# Patient Record
Sex: FEMALE | Race: WHITE | Employment: FULL TIME | URBAN - METROPOLITAN AREA
[De-identification: names, ages, dates, MRNs, and addresses within clinical notes are randomized per-mention and may not be internally consistent; named-entity substitution may affect disease eponyms.]

---

## 2017-04-28 PROBLEM — D50.9 IRON DEFICIENCY ANEMIA: Status: ACTIVE | Noted: 2017-04-28

## 2017-07-15 ENCOUNTER — HOSPITAL ENCOUNTER (INPATIENT)
Age: 37
LOS: 2 days | Discharge: HOME | End: 2017-07-17
Attending: OBSTETRICS & GYNECOLOGY | Admitting: OBSTETRICS & GYNECOLOGY
Payer: COMMERCIAL

## 2017-07-15 ENCOUNTER — ANESTHESIA EVENT (OUTPATIENT)
Dept: LABOR AND DELIVERY | Age: 37
End: 2017-07-15
Payer: COMMERCIAL

## 2017-07-15 ENCOUNTER — ANESTHESIA (OUTPATIENT)
Dept: LABOR AND DELIVERY | Age: 37
End: 2017-07-15
Payer: COMMERCIAL

## 2017-07-15 PROBLEM — Z37.9 NORMAL LABOR: Status: ACTIVE | Noted: 2017-07-15

## 2017-07-15 LAB
ERYTHROCYTE [DISTWIDTH] IN BLOOD BY AUTOMATED COUNT: 17 % (ref 11.9–14.6)
HCT VFR BLD AUTO: 36.4 % (ref 35.8–46.3)
HGB BLD-MCNC: 12.5 G/DL (ref 11.7–15.4)
MCH RBC QN AUTO: 30.9 PG (ref 26.1–32.9)
MCHC RBC AUTO-ENTMCNC: 34.3 G/DL (ref 31.4–35)
MCV RBC AUTO: 89.9 FL (ref 79.6–97.8)
PLATELET # BLD AUTO: 262 K/UL (ref 150–450)
PMV BLD AUTO: 11 FL (ref 10.8–14.1)
RBC # BLD AUTO: 4.05 M/UL (ref 4.05–5.25)
WBC # BLD AUTO: 12 K/UL (ref 4.3–11.1)

## 2017-07-15 PROCEDURE — 85027 COMPLETE CBC AUTOMATED: CPT | Performed by: OBSTETRICS & GYNECOLOGY

## 2017-07-15 PROCEDURE — 65270000029 HC RM PRIVATE

## 2017-07-15 PROCEDURE — 99283 EMERGENCY DEPT VISIT LOW MDM: CPT

## 2017-07-15 PROCEDURE — 77030014125 HC TY EPDRL BBMI -B: Performed by: ANESTHESIOLOGY

## 2017-07-15 PROCEDURE — 74011250636 HC RX REV CODE- 250/636

## 2017-07-15 PROCEDURE — 74011250637 HC RX REV CODE- 250/637: Performed by: OBSTETRICS & GYNECOLOGY

## 2017-07-15 PROCEDURE — 86900 BLOOD TYPING SEROLOGIC ABO: CPT | Performed by: OBSTETRICS & GYNECOLOGY

## 2017-07-15 PROCEDURE — 74011250637 HC RX REV CODE- 250/637: Performed by: ANESTHESIOLOGY

## 2017-07-15 PROCEDURE — 77030003665 HC NDL SPN BBMI -A: Performed by: ANESTHESIOLOGY

## 2017-07-15 PROCEDURE — 74011000250 HC RX REV CODE- 250

## 2017-07-15 PROCEDURE — 4A1HXCZ MONITORING OF PRODUCTS OF CONCEPTION, CARDIAC RATE, EXTERNAL APPROACH: ICD-10-PCS | Performed by: OBSTETRICS & GYNECOLOGY

## 2017-07-15 RX ORDER — DEXTROSE, SODIUM CHLORIDE, SODIUM LACTATE, POTASSIUM CHLORIDE, AND CALCIUM CHLORIDE 5; .6; .31; .03; .02 G/100ML; G/100ML; G/100ML; G/100ML; G/100ML
125 INJECTION, SOLUTION INTRAVENOUS CONTINUOUS
Status: DISCONTINUED | OUTPATIENT
Start: 2017-07-15 | End: 2017-07-16 | Stop reason: HOSPADM

## 2017-07-15 RX ORDER — MINERAL OIL
120 OIL (ML) ORAL
Status: COMPLETED | OUTPATIENT
Start: 2017-07-15 | End: 2017-07-15

## 2017-07-15 RX ORDER — SODIUM CHLORIDE 0.9 % (FLUSH) 0.9 %
5-10 SYRINGE (ML) INJECTION AS NEEDED
Status: DISCONTINUED | OUTPATIENT
Start: 2017-07-15 | End: 2017-07-16 | Stop reason: HOSPADM

## 2017-07-15 RX ORDER — ROPIVACAINE HYDROCHLORIDE 2 MG/ML
INJECTION, SOLUTION EPIDURAL; INFILTRATION; PERINEURAL
Status: DISCONTINUED | OUTPATIENT
Start: 2017-07-15 | End: 2017-07-16 | Stop reason: HOSPADM

## 2017-07-15 RX ORDER — ROPIVACAINE HYDROCHLORIDE 2 MG/ML
INJECTION, SOLUTION EPIDURAL; INFILTRATION; PERINEURAL AS NEEDED
Status: DISCONTINUED | OUTPATIENT
Start: 2017-07-15 | End: 2017-07-16 | Stop reason: HOSPADM

## 2017-07-15 RX ORDER — OXYTOCIN/RINGER'S LACTATE 15/250 ML
250 PLASTIC BAG, INJECTION (ML) INTRAVENOUS ONCE
Status: COMPLETED | OUTPATIENT
Start: 2017-07-15 | End: 2017-07-16

## 2017-07-15 RX ORDER — LIDOCAINE HYDROCHLORIDE AND EPINEPHRINE 15; 5 MG/ML; UG/ML
INJECTION, SOLUTION EPIDURAL AS NEEDED
Status: DISCONTINUED | OUTPATIENT
Start: 2017-07-15 | End: 2017-07-16 | Stop reason: HOSPADM

## 2017-07-15 RX ORDER — BUTORPHANOL TARTRATE 1 MG/ML
1 INJECTION INTRAMUSCULAR; INTRAVENOUS
Status: DISCONTINUED | OUTPATIENT
Start: 2017-07-15 | End: 2017-07-16 | Stop reason: HOSPADM

## 2017-07-15 RX ORDER — LIDOCAINE HYDROCHLORIDE 20 MG/ML
JELLY TOPICAL
Status: DISCONTINUED | OUTPATIENT
Start: 2017-07-15 | End: 2017-07-16 | Stop reason: HOSPADM

## 2017-07-15 RX ORDER — FAMOTIDINE 20 MG/1
20 TABLET, FILM COATED ORAL ONCE
Status: COMPLETED | OUTPATIENT
Start: 2017-07-15 | End: 2017-07-15

## 2017-07-15 RX ORDER — SODIUM CHLORIDE 0.9 % (FLUSH) 0.9 %
5-10 SYRINGE (ML) INJECTION EVERY 8 HOURS
Status: DISCONTINUED | OUTPATIENT
Start: 2017-07-15 | End: 2017-07-16 | Stop reason: HOSPADM

## 2017-07-15 RX ORDER — LIDOCAINE HYDROCHLORIDE 10 MG/ML
1 INJECTION INFILTRATION; PERINEURAL
Status: COMPLETED | OUTPATIENT
Start: 2017-07-15 | End: 2017-07-16

## 2017-07-15 RX ADMIN — LIDOCAINE HYDROCHLORIDE AND EPINEPHRINE 5 ML: 15; 5 INJECTION, SOLUTION EPIDURAL at 20:03

## 2017-07-15 RX ADMIN — ROPIVACAINE HYDROCHLORIDE 8 ML/HR: 2 INJECTION, SOLUTION EPIDURAL; INFILTRATION; PERINEURAL at 20:06

## 2017-07-15 RX ADMIN — FAMOTIDINE 20 MG: 20 TABLET ORAL at 19:48

## 2017-07-15 RX ADMIN — MINERAL OIL 120 ML: 471.95 OIL ORAL at 23:55

## 2017-07-15 RX ADMIN — ROPIVACAINE HYDROCHLORIDE 5 ML: 2 INJECTION, SOLUTION EPIDURAL; INFILTRATION; PERINEURAL at 20:05

## 2017-07-15 NOTE — IP AVS SNAPSHOT
Current Discharge Medication List  
  
START taking these medications Dose & Instructions Dispensing Information Comments Morning Noon Evening Bedtime HYDROcodone-acetaminophen 7.5-325 mg per tablet Commonly known as:  Jimena Ivan Your last dose was: Your next dose is:    
   
   
 Dose:  1 Tab Take 1 Tab by mouth every six (6) hours as needed. Max Daily Amount: 4 Tabs. Quantity:  20 Tab Refills:  0  
     
   
   
   
  
 ibuprofen 800 mg tablet Commonly known as:  MOTRIN Your last dose was: Your next dose is:    
   
   
 Dose:  800 mg Take 1 Tab by mouth every six (6) hours as needed. Quantity:  90 Tab Refills:  0 CONTINUE these medications which have NOT CHANGED Dose & Instructions Dispensing Information Comments Morning Noon Evening Bedtime  
 doxylamine-pyridoxine 10-10 mg Tbec Commonly known as:  Luberta Lion Your last dose was: Your next dose is:    
   
   
 Dose:  2 Each Take 2 Each by mouth nightly as needed. 2 po qhs  
 Quantity:  60 Tab Refills:  11 Iron, Cbn & Gluc-FA-B12-C-DSS 90-1-12-50 mg-mg-mcg-mg Tab Commonly known as:  FERRALET 90 DUAL-IRON DELIVERY Your last dose was: Your next dose is:    
   
   
 Dose:  1 Tab Take 1 Tab by mouth daily. Quantity:  90 Tab Refills:  2 OTHER Your last dose was: Your next dose is:    
   
   
  Refills:  0 Where to Get Your Medications These medications were sent to [address] [address] Hours:  24-hours Phone:  [phone]  
  ibuprofen 800 mg tablet Information on where to get these meds will be given to you by the nurse or doctor. ! Ask your nurse or doctor about these medications HYDROcodone-acetaminophen 7.5-325 mg per tablet

## 2017-07-15 NOTE — H&P
History & Physical    Name: Danny Gonzales MRN: [id number]  SSN: [id number]    YOB: 1980  Age: 29 y.o. Sex: female        Subjective:     Estimated Date of Delivery: 17  OB History    Para Term  AB Living   1 0 0      SAB TAB Ectopic Molar Multiple Live Births              # Outcome Date GA Lbr Avni/2nd Weight Sex Delivery Anes PTL Lv   1 Current                   Ms. Naila Romo is seen with pregnancy at 39w4d for active labor. Began having stronger contractions earlier today. SROM just prior to arrival. Prenatal course was normal.  the patients states that the baby moves as usual   Please see prenatal records for details. OB History    Para Term  AB Living   1 0 0      SAB TAB Ectopic Molar Multiple Live Births              # Outcome Date GA Lbr Avni/2nd Weight Sex Delivery Anes PTL Lv   1 Current                   Past Medical History:   Diagnosis Date    Iron deficiency anemia 2017     Past Surgical History:   Procedure Laterality Date    HX WISDOM TEETH EXTRACTION       Social History     Occupational History    Not on file. Social History Main Topics    Smoking status: Never Smoker    Smokeless tobacco: Never Used    Alcohol use No      Comment: social/none now    Drug use: No    Sexual activity: Yes     Partners: Male     Birth control/ protection: None     Family History   Problem Relation Age of Onset    Ovarian Cancer Mother 43    Cancer Mother      uterine    Colon Cancer Neg Hx     Breast Cancer Neg Hx        Allergies   Allergen Reactions    Augmentin [Amoxicillin-Pot Clavulanate] Hives     Prior to Admission medications    Medication Sig Start Date End Date Taking? Authorizing Provider   Iron, Cbn & Gluc-FA-B12-C-DSS (FERRALET 90 DUAL-IRON DELIVERY) 90-1-12-50 mg-mg-mcg-mg tab Take 1 Tab by mouth daily.  5/15/17   Felicity Mckinney MD   OTHER     Historical Provider   doxylamine-pyridoxine (DICLEGIS) 10-10 mg TbEC Take 2 Each by mouth nightly as needed. 2 po qhs 16   Heath Crow MD        Review of Systems:  Constitutional:No headache, fever  Cardiac:   No chest pain      Resp: No cough or shortness of breath     GI:   No nausea/vomiting, diarrhea, abdominal pain    :   No dysuria  Neuro:     No vision changes, headache      Objective:     Vitals: There were no vitals filed for this visit. Physical Exam:  Patient without distress. Heart: Regular rate and rhythm  Lung: clear to auscultation throughout lung fields, no wheezes, no rales, no rhonchi and normal respiratory effort  Back: costovertebral angle tenderness absent  Abdomen: soft, nontender, without guarding, without rebound  Fundus: soft and non tender  Cervical Exam: 5 cm dilated    100% effaced    0 station    Presenting Part: cephalic  Cervical Position: mid position  Lower Extremities:  - Edema 1+  Membranes:  Spontaneous Rupture of Membranes; Amniotic Fluid: clear fluid  Fetal Heart Rate tracing: Category 1  Uterine contractions: regular, every 3 minutes    Prenatal Labs:   Lab Results   Component Value Date/Time    Rubella, External immune 2016    GrBStrep, External negative 2017    HBsAg, External negative 2016    HIV, External negative 2016    RPR, External non reactive 2016    Gonorrhea, External Negative 2016    Chlamydia, External Negative 2016         Assessment/Plan:     Ms. Doroteo Alonso is a  seen with pregnancy at 39w4d for srom. Plan:     Admit for labor management    Patient discussed with Dr. Nadia Perez MD

## 2017-07-15 NOTE — IP AVS SNAPSHOT
[address] 
[phone] Patient: Fatimah Jennings MRN: [id number] You are allergic to the following Allergen Reactions Augmentin (Amoxicillin-Pot Clavulanate) Hives Recent Documentation Height Breastfeeding? OB Status Smoking Status 1.626 m Unknown Recent pregnancy Never Smoker Emergency Contacts Name Discharge Info Relation Home Work Mobile Juice Samayoa  Spouse [3] [phone] About your hospitalization You were admitted on:  July 15, 2017 You last received care in the:  [address] [facility] You were discharged on:  July 17, 2017 Unit phone number:  [phone] Why you were hospitalized Your primary diagnosis was:  Normal Labor Your diagnoses also included:  Normal Pregnancy Providers Seen During Your Hospitalizations Provider Role Specialty Primary office phone Bette Tracy MD Attending Provider Obstetrics & Gynecology [phone] Your Primary Care Physician (PCP) Primary Care Physician Office Phone Office Fax NONE ** None ** ** None ** Follow-up Information Follow up With Details Comments Contact Info None   None ([phone]) Patient stated that they have no PCP Joaquin Pickering MD In 6 weeks  [address] [address] 
[phone] Your Appointments Monday July 17, 2017  4:10 PM EDT Return OB with Charlene Gomez MD  
[facility] [address] [place] [id number] [phone] Current Discharge Medication List  
  
START taking these medications Dose & Instructions Dispensing Information Comments Morning Noon Evening Bedtime HYDROcodone-acetaminophen 7.5-325 mg per tablet Commonly known as:  Zaira Iron Your last dose was: Your next dose is: Dose:  1 Tab Take 1 Tab by mouth every six (6) hours as needed. Max Daily Amount: 4 Tabs. Quantity:  20 Tab Refills:  0  
     
   
   
   
  
 ibuprofen 800 mg tablet Commonly known as:  MOTRIN Your last dose was: Your next dose is:    
   
   
 Dose:  800 mg Take 1 Tab by mouth every six (6) hours as needed. Quantity:  90 Tab Refills:  0 CONTINUE these medications which have NOT CHANGED Dose & Instructions Dispensing Information Comments Morning Noon Evening Bedtime  
 doxylamine-pyridoxine 10-10 mg Tbec Commonly known as:  Senait Bi Your last dose was: Your next dose is:    
   
   
 Dose:  2 Each Take 2 Each by mouth nightly as needed. 2 po qhs  
 Quantity:  60 Tab Refills:  11 Iron, Cbn & Gluc-FA-B12-C-DSS 90-1-12-50 mg-mg-mcg-mg Tab Commonly known as:  FERRALET 90 DUAL-IRON DELIVERY Your last dose was: Your next dose is:    
   
   
 Dose:  1 Tab Take 1 Tab by mouth daily. Quantity:  90 Tab Refills:  2 OTHER Your last dose was: Your next dose is:    
   
   
  Refills:  0 Where to Get Your Medications These medications were sent to [address] [address] Hours:  24-hours Phone:  [phone]  
  ibuprofen 800 mg tablet Information on where to get these meds will be given to you by the nurse or doctor. ! Ask your nurse or doctor about these medications HYDROcodone-acetaminophen 7.5-325 mg per tablet Discharge Instructions Discharge instruction to follow: Activity: Pelvis rest for 6 weeks No heavy lifting over 15 lbs for 2 weeks No driving for 2 weeks No push/pull motion such as sweeping or vacuuming for 2 weeks No tub baths for 6 weeks If using sitz bath continue until comfortable stopping. If using jerzy-bottle continue to use until comfortable stopping. Change sanitary pad after each urination or bowel movement. Call MD for the following: 
    Fever over 101 F; pain not relieved by medication; foul smelling vaginal discharge or an increase in vaginal bleeding. Take medication as prescribed. Follow up with MD as order. After Your Delivery (the Postpartum Period): Care Instructions Your Care Instructions Congratulations on the birth of your baby. Like pregnancy, the  period can be a time of excitement, j luis, and exhaustion. You may look at your wondrous little baby and feel happy. You may also be overwhelmed by your new sleep hours and new responsibilities. At first, babies often sleep during the days and are awake at night. They do not have a pattern or routine. They may make sudden gasps, jerk themselves awake, or look like they have crossed eyes. These are all normal, and they may even make you smile. In these first weeks after delivery, try to take good care of yourself. It may take 4 to 6 weeks to feel like yourself again, and possibly longer if you had a  birth. You will likely feel very tired for several weeks. Your days will be full of ups and downs, but lots of j luis as well. Follow-up care is a key part of your treatment and safety. Be sure to make and go to all appointments, and call your doctor if you are having problems. It's also a good idea to know your test results and keep a list of the medicines you take. How can you care for yourself at home? Take care of your body after delivery · Use pads instead of tampons for the bloody flow that may last as long as 2 weeks. · Ease cramps with ibuprofen (Advil, Motrin). · Ease soreness of hemorrhoids and the area between your vagina and rectum with ice compresses or witch hazel pads. · Ease constipation by drinking lots of fluid and eating high-fiber foods. Ask your doctor about over-the-counter stool softeners. · Cleanse yourself with a gentle squeeze of warm water from a bottle instead of wiping with toilet paper. · Take a sitz bath in warm water several times a day. · Wear a good nursing bra. Ease sore and swollen breasts with warm, wet washcloths. · If you are not breastfeeding, use ice rather than heat for breast soreness. · Your period may not start for several months if you are breastfeeding. You may bleed more, and longer at first, than you did before you got pregnant. · Wait until you are healed (about 4 to 6 weeks) before you have sexual intercourse. Your doctor will tell you when it is okay to have sex. · Try not to travel with your baby for 5 or 6 weeks. If you take a long car trip, make frequent stops to walk around and stretch. Avoid exhaustion · Rest every day. Try to nap when your baby naps. · Ask another adult to be with you for a few days after delivery. · Plan for  if you have other children. · Stay flexible so you can eat at odd hours and sleep when you need to. Both you and your baby are making new schedules. · Plan small trips to get out of the house. Change can make you feel less tired. · Ask for help with housework, cooking, and shopping. Remind yourself that your job is to care for your baby. Know about help for postpartum depression · \"Baby blues\" are common for the first 1 to 2 weeks after birth. You may cry or feel sad or irritable for no reason. · Rest whenever you can. Being tired makes it harder to handle your emotions. · Go for walks with your baby. · Talk to your partner, friends, and family about your feelings. · If your symptoms last for more than a few weeks, or if you feel very depressed, ask your doctor for help. · Postpartum depression can be treated. Support groups and counseling can help. Sometimes medicine can also help. Stay healthy · Eat healthy foods so you have more energy, make good breast milk, and lose extra baby pounds. · If you breastfeed, avoid alcohol and drugs. Stay smoke-free. If you quit during pregnancy, congratulations. · Start daily exercise after 4 to 6 weeks, but rest when you feel tired. · Learn exercises to tone your belly. Do Kegel exercises to regain strength in your pelvic muscles. You can do these exercises while you stand or sit. ¨ Squeeze the same muscles you would use to stop your urine. Your belly and thighs should not move. ¨ Hold the squeeze for 3 seconds, and then relax for 3 seconds. ¨ Start with 3 seconds. Then add 1 second each week until you are able to squeeze for 10 seconds. ¨ Repeat the exercise 10 to 15 times for each session. Do three or more sessions each day. · Find a class for new mothers and new babies that has an exercise time. · If you had a  birth, give yourself a bit more time before you exercise, and be careful. When should you call for help? Call 911 anytime you think you may need emergency care. For example, call if: 
· You passed out (lost consciousness). Call your doctor now or seek immediate medical care if: 
· You have severe vaginal bleeding. This means you are passing blood clots and soaking through a pad each hour for 2 or more hours. · You are dizzy or lightheaded, or you feel like you may faint. · You have a fever. · You have new belly pain, or your pain gets worse. Watch closely for changes in your health, and be sure to contact your doctor if: 
· Your vaginal bleeding seems to be getting heavier. · You have new or worse vaginal discharge. · You feel sad, anxious, or hopeless for more than a few days. · You do not get better as expected. Where can you learn more? Go to http://lexa-kamari.info/. Enter A461 in the search box to learn more about \"After Your Delivery (the Postpartum Period): Care Instructions. \" 
 Current as of: March 16, 2017 Content Version: 11.3 © 9674-1939 [employer]. Care instructions adapted under license by [employer] (which disclaims liability or warranty for this information). If you have questions about a medical condition or this instruction, always ask your healthcare professional. [address] any warranty or liability for your use of this information. DISCHARGE SUMMARY from Nurse The following personal items are in your possession at time of discharge: 
 
Dental Appliances: None Visual Aid: Contacts Home Medications: None Jewelry: Patience Davis Clothing: Pants, Footwear, Shirt Other Valuables: Erika Johnson PATIENT INSTRUCTIONS: 
 
After general anesthesia or intravenous sedation, for 24 hours or while taking prescription Narcotics: · Limit your activities · Do not drive and operate hazardous machinery · Do not make important personal or business decisions · Do  not drink alcoholic beverages · If you have not urinated within 8 hours after discharge, please contact your surgeon on call. Report the following to your surgeon: 
· Excessive pain, swelling, redness or odor of or around the surgical area · Temperature over 100.5 · Nausea and vomiting lasting longer than 4 hours or if unable to take medications · Any signs of decreased circulation or nerve impairment to extremity: change in color, persistent  numbness, tingling, coldness or increase pain · Any questions What to do at Home: *  Please give a list of your current medications to your Primary Care Provider. *  Please update this list whenever your medications are discontinued, doses are 
    changed, or new medications (including over-the-counter products) are added. *  Please carry medication information at all times in case of emergency situations. These are general instructions for a healthy lifestyle: No smoking/ No tobacco products/ Avoid exposure to second hand smoke Surgeon General's Warning:  Quitting smoking now greatly reduces serious risk to your health. Obesity, smoking, and sedentary lifestyle greatly increases your risk for illness A healthy diet, regular physical exercise & weight monitoring are important for maintaining a healthy lifestyle You may be retaining fluid if you have a history of heart failure or if you experience any of the following symptoms:  Weight gain of 3 pounds or more overnight or 5 pounds in a week, increased swelling in our hands or feet or shortness of breath while lying flat in bed. Please call your doctor as soon as you notice any of these symptoms; do not wait until your next office visit. Recognize signs and symptoms of STROKE: 
 
F-face looks uneven A-arms unable to move or move unevenly S-speech slurred or non-existent T-time-call 911 as soon as signs and symptoms begin-DO NOT go Back to bed or wait to see if you get better-TIME IS BRAIN. Warning Signs of HEART ATTACK Call 911 if you have these symptoms: 
? Chest discomfort. Most heart attacks involve discomfort in the center of the chest that lasts more than a few minutes, or that goes away and comes back. It can feel like uncomfortable pressure, squeezing, fullness, or pain. ? Discomfort in other areas of the upper body. Symptoms can include pain or discomfort in one or both arms, the back, neck, jaw, or stomach. ? Shortness of breath with or without chest discomfort. ? Other signs may include breaking out in a cold sweat, nausea, or lightheadedness. Don't wait more than five minutes to call [address]! Fast action can save your life. Calling 911 is almost always the fastest way to get lifesaving treatment. Emergency Medical Services staff can begin treatment when they arrive  up to an hour sooner than if someone gets to the hospital by car. The discharge information has been reviewed with the patient. The patient verbalized understanding. Discharge medications reviewed with the patient and appropriate educational materials and side effects teaching were provided. Discharge Orders None [facility]! Dear Jennifer Hines: Thank you for requesting a [employer] account. Our records indicate that you already have an active [employer] account. You can access your account anytime at [email or web address] Did you know that you can access your hospital and ER discharge instructions at any time in [employer]? You can also review all of your test results from your hospital stay or ER visit. Additional Information If you have questions, please visit the Frequently Asked Questions section of the [employer] website at [email or web address] Remember, [employer] is NOT to be used for urgent needs. For medical emergencies, dial 911. Now available from your iPhone and Android! General Information Please provide this summary of care documentation to your next provider. Patient Signature:  ____________________________________________________________ Date:  ____________________________________________________________  
  
Maureen Molina Provider Signature:  ____________________________________________________________ Date:  ____________________________________________________________

## 2017-07-16 LAB
ABO + RH BLD: NORMAL
BLOOD GROUP ANTIBODIES SERPL: NORMAL
SPECIMEN EXP DATE BLD: NORMAL

## 2017-07-16 PROCEDURE — 75410000002 HC LABOR FEE PER 1 HR: Performed by: OBSTETRICS & GYNECOLOGY

## 2017-07-16 PROCEDURE — 65270000029 HC RM PRIVATE

## 2017-07-16 PROCEDURE — 74011250636 HC RX REV CODE- 250/636: Performed by: OBSTETRICS & GYNECOLOGY

## 2017-07-16 PROCEDURE — 75410000000 HC DELIVERY VAGINAL/SINGLE: Performed by: OBSTETRICS & GYNECOLOGY

## 2017-07-16 PROCEDURE — 76060000078 HC EPIDURAL ANESTHESIA

## 2017-07-16 PROCEDURE — 77030003028 HC SUT VCRL J&J -A

## 2017-07-16 PROCEDURE — 77030002888 HC SUT CHRMC J&J -A

## 2017-07-16 PROCEDURE — 75410000003 HC RECOV DEL/VAG/CSECN EA 0.5 HR: Performed by: OBSTETRICS & GYNECOLOGY

## 2017-07-16 PROCEDURE — 74011250637 HC RX REV CODE- 250/637: Performed by: OBSTETRICS & GYNECOLOGY

## 2017-07-16 PROCEDURE — 74011000250 HC RX REV CODE- 250: Performed by: OBSTETRICS & GYNECOLOGY

## 2017-07-16 RX ORDER — OXYTOCIN/RINGER'S LACTATE 15/250 ML
250 PLASTIC BAG, INJECTION (ML) INTRAVENOUS ONCE
Status: ACTIVE | OUTPATIENT
Start: 2017-07-16 | End: 2017-07-16

## 2017-07-16 RX ORDER — DOCUSATE SODIUM 100 MG/1
100 CAPSULE, LIQUID FILLED ORAL 2 TIMES DAILY
Status: DISCONTINUED | OUTPATIENT
Start: 2017-07-16 | End: 2017-07-17 | Stop reason: HOSPADM

## 2017-07-16 RX ORDER — IBUPROFEN 800 MG/1
800 TABLET ORAL
Status: DISCONTINUED | OUTPATIENT
Start: 2017-07-16 | End: 2017-07-17 | Stop reason: HOSPADM

## 2017-07-16 RX ORDER — LIDOCAINE HYDROCHLORIDE 20 MG/ML
INJECTION, SOLUTION EPIDURAL; INFILTRATION; INTRACAUDAL; PERINEURAL AS NEEDED
Status: DISCONTINUED | OUTPATIENT
Start: 2017-07-16 | End: 2017-07-16 | Stop reason: HOSPADM

## 2017-07-16 RX ORDER — ZOLPIDEM TARTRATE 5 MG/1
5 TABLET ORAL
Status: DISCONTINUED | OUTPATIENT
Start: 2017-07-16 | End: 2017-07-17 | Stop reason: HOSPADM

## 2017-07-16 RX ORDER — HYDROCODONE BITARTRATE AND ACETAMINOPHEN 7.5; 325 MG/1; MG/1
1 TABLET ORAL
Status: DISCONTINUED | OUTPATIENT
Start: 2017-07-16 | End: 2017-07-17 | Stop reason: HOSPADM

## 2017-07-16 RX ORDER — IBUPROFEN 800 MG/1
800 TABLET ORAL
Status: COMPLETED | OUTPATIENT
Start: 2017-07-16 | End: 2017-07-16

## 2017-07-16 RX ORDER — SIMETHICONE 80 MG
80 TABLET,CHEWABLE ORAL
Status: DISCONTINUED | OUTPATIENT
Start: 2017-07-16 | End: 2017-07-17 | Stop reason: HOSPADM

## 2017-07-16 RX ORDER — LOPERAMIDE HYDROCHLORIDE 2 MG/1
2 CAPSULE ORAL AS NEEDED
Status: DISCONTINUED | OUTPATIENT
Start: 2017-07-16 | End: 2017-07-17 | Stop reason: HOSPADM

## 2017-07-16 RX ORDER — ONDANSETRON 4 MG/1
4 TABLET, ORALLY DISINTEGRATING ORAL
Status: DISCONTINUED | OUTPATIENT
Start: 2017-07-16 | End: 2017-07-17 | Stop reason: HOSPADM

## 2017-07-16 RX ORDER — DIPHENHYDRAMINE HCL 25 MG
25 CAPSULE ORAL
Status: DISCONTINUED | OUTPATIENT
Start: 2017-07-16 | End: 2017-07-17 | Stop reason: HOSPADM

## 2017-07-16 RX ADMIN — IBUPROFEN 800 MG: 800 TABLET, FILM COATED ORAL at 01:47

## 2017-07-16 RX ADMIN — IBUPROFEN 800 MG: 800 TABLET ORAL at 20:42

## 2017-07-16 RX ADMIN — IBUPROFEN 800 MG: 800 TABLET ORAL at 08:09

## 2017-07-16 RX ADMIN — IBUPROFEN 800 MG: 800 TABLET ORAL at 14:06

## 2017-07-16 RX ADMIN — LIDOCAINE HYDROCHLORIDE 10 ML: 20 INJECTION, SOLUTION EPIDURAL; INFILTRATION; INTRACAUDAL; PERINEURAL at 00:12

## 2017-07-16 RX ADMIN — LIDOCAINE HYDROCHLORIDE 0.1 ML: 10 INJECTION, SOLUTION INFILTRATION; PERINEURAL at 00:40

## 2017-07-16 RX ADMIN — DOCUSATE SODIUM 100 MG: 100 CAPSULE, LIQUID FILLED ORAL at 20:42

## 2017-07-16 RX ADMIN — Medication 15000 MILLI-UNITS/HR: at 00:43

## 2017-07-16 RX ADMIN — DOCUSATE SODIUM 100 MG: 100 CAPSULE, LIQUID FILLED ORAL at 08:09

## 2017-07-16 NOTE — LACTATION NOTE
This note was copied from a baby's chart. In to check on feedings. Baby still early in first 24 hours. Has been latching well per mom. In to assist with feeding. Reviewed waking measures and suck practice. Baby had small emesis prior to suck assessment. Showed mom how to burp infant and keep upright. Baby has good suck on assessment. Assisted on right breast in cross cradle hold. Reviewed how to support infant and breast to aide in deep latch. Mom has good technique. Baby latched but took a few minutes for baby to begin to actively suckle. Had to relatch 2 times but then baby latched deeply and continued to actively feed. Baby observed x 20 minutes on right breast. Doing well. Reviewed feeding expectations. Feed on demand. Discussed cluster feeding normalcy. Watch output. Has had void, and stool.

## 2017-07-16 NOTE — ROUTINE PROCESS
SBAR IN Report: Mother    Verbal report received from Brooklyn Guy RN on this patient, who is now being transferred from [facility] (unit) for routine progression of care. The patient is not wearing a green \"Anesthesia-Duramorph\" band. Report consisted of patient's Situation, Background, Assessment and Recommendations (SBAR).  ID bands were compared with the identification form, and verified with the patient and transferring nurse. Information from the Procedure Summary and the [place] Report was reviewed with the transferring nurse; opportunity for questions and clarification provided.

## 2017-07-16 NOTE — PROGRESS NOTES
2347- COMPLETE, MD AWARE, Orders to start pushing. 2350-pushing started    0020-pt set and prepped for delivery.     1- , viable female infant, apgars 9/9, wt 7.14oz, Dr Rosie Hernández

## 2017-07-16 NOTE — L&D DELIVERY NOTE
Delivery Summary    Patient: Josh Sicard MRN: [id number]  SSN: [id number]    YOB: 1980  Age: 29 y.o. Sex: female       Information for the patient's :  Chyna Sivla [[id number]]       Labor Events:    Labor: No   Rupture Date: 7/15/2017   Rupture Time: 5:30 PM   Rupture Type SROM   Amniotic Fluid Volume:      Amniotic Fluid Description: Clear None   Induction: None       Augmentation: None   Labor Events: None     Cervical Ripening:     None     Delivery Events:  Episiotomy: None;Midline   Laceration(s): None; Other (comment)     Repaired: Yes    Number of Repair Packets: 2   Suture Type and Size: Chromic 3-0  Vicryl 2-0     Estimated Blood Loss (ml): 200ml       Delivery Date: 2017    Delivery Time: 12:35 AM  Delivery Type: Vaginal, Spontaneous Delivery  Sex:  Female     Gestational Age: 38w11d   Delivery Clinician:  Jose Bethea  Living Status: Living   Delivery Location: [address]D [address]          APGARS  One minute Five minutes Ten minutes   Skin color: 1   1        Heart rate: 2   2        Grimace: 2   2        Muscle tone: 2   2        Breathin   2        Totals: 9   9            Presentation: Vertex    Position: Right Occiput Anterior  Resuscitation Method:  Suctioning-bulb; Tactile Stimulation     Meconium Stained: None      Cord Vessels: 3 Vessels      Cord Events:    Cord Blood Sent?:  Yes    Blood Gases Sent?:  No    Placenta:  Date/Time:  12:41 AM  Removal: Spontaneous      Appearance: Normal;Intact      Measurements:  Birth Weight: 7 lb 14 oz (3.572 kg)      Birth Length: 52 cm      Head Circumference: 33 cm      Chest Circumference: 33 cm     Abdominal Girth:       Other Providers:   Seth HERNANDEZ;GABRIELLE AMAYA;DAILY FINLEY;NELLY MAY, Obstetrician;Primary Nurse;Charge Nurse;Scrub Tech           Group B Strep:   Lab Results   Component Value Date/Time    Hayley, External negative 2017     Information for the patient's :  Rea Vaughn [[id number]]   No results found for: ABORH, PCTABR, PCTDIG, BILI, ABORHEXT, ABORH    No results found for: APH, APCO2, APO2, AHCO3, ABEC, ABDC, O2ST, EPHV, PCO2V, PO2V, HCO3V, EBEV, EBDV, SITE, RSCOM

## 2017-07-16 NOTE — PROGRESS NOTES
SBAR OUT Report: Mother    Verbal report given to SWEETIE Santizo RN on this patient, who is now being transferred to [facility] (unit) for routine progression of care. The patient is not wearing a green \"Anesthesia-Duramorph\" band. Report consisted of patient's Situation, Background, Assessment and Recommendations (SBAR).  ID bands were compared with the identification form, and verified with the patient and receiving nurse. Information from the SBAR, Procedure Summary, Intake/Output and MAR and the [place] Report was reviewed with the receiving nurse; opportunity for questions and clarification provided.

## 2017-07-16 NOTE — ANESTHESIA PROCEDURE NOTES
Epidural Block    Performed by: Pablito Blackwell  Authorized by: Pablito Blackwell     Pre-Procedure  Indication: labor epidural    Preanesthetic Checklist: patient identified, risks and benefits discussed, anesthesia consent, patient being monitored, timeout performed and anesthesia consent    Timeout Time: 19:55        Epidural:   Patient position:  Seated  Prep region:  Lumbar  Prep: Patient draped and Chlorhexidine    Location:  L3-4    Needle and Epidural Catheter:   Needle Type:  Tuohy  Needle Gauge:  17 G  Injection Technique:  Loss of resistance using air  Attempts:  1  Catheter Size:  19 G  Events: no blood with aspiration, no cerebrospinal fluid with aspiration, no paresthesia and negative aspiration test    Test Dose:  Lidocaine 1.5% w/ epi and negative    Assessment:   Catheter Secured:  Tegaderm and tape  Insertion:  Uncomplicated  Patient tolerance:  Patient tolerated the procedure well with no immediate complications  All needles out intact, procedure tolerated well without problems

## 2017-07-16 NOTE — PROGRESS NOTES
2242 Dr. Feliciano Grant at bedside. Reviewing FHR tracing. SVE 9/100/+1. Fetal position transverse. No new orders received.

## 2017-07-16 NOTE — H&P
History & Physical    Name: Duncan Shelby MRN: [id number]  SSN: [id number]    YOB: 1980  Age: 29 y.o. Sex: female        Subjective:     Estimated Date of Delivery: 17  OB History    Para Term  AB Living   1 0 0      SAB TAB Ectopic Molar Multiple Live Births              # Outcome Date GA Lbr Avni/2nd Weight Sex Delivery Anes PTL Lv   1 Current                   Ms. James Germain is admitted with pregnancy at 39w4d for active labor. Prenatal course was normal. Please see prenatal records for details. Past Medical History:   Diagnosis Date    Iron deficiency anemia 2017     Past Surgical History:   Procedure Laterality Date    HX WISDOM TEETH EXTRACTION       Social History     Occupational History    Not on file. Social History Main Topics    Smoking status: Never Smoker    Smokeless tobacco: Never Used    Alcohol use No      Comment: social/none now    Drug use: No    Sexual activity: Yes     Partners: Male     Birth control/ protection: None     Family History   Problem Relation Age of Onset    Ovarian Cancer Mother 43    Cancer Mother      uterine    Colon Cancer Neg Hx     Breast Cancer Neg Hx        Allergies   Allergen Reactions    Augmentin [Amoxicillin-Pot Clavulanate] Hives     Prior to Admission medications    Medication Sig Start Date End Date Taking? Authorizing Provider   Iron, Cbn & Gluc-FA-B12-C-DSS (FERRALET 90 DUAL-IRON DELIVERY) 90-1-12-50 mg-mg-mcg-mg tab Take 1 Tab by mouth daily. 5/15/17  Yes Manohar Maya MD   doxylamine-pyridoxine (DICLEGIS) 10-10 mg TbEC Take 2 Each by mouth nightly as needed. 2 po qhs 16  Yes Manohar Maya MD   OTHER     Historical Provider        Review of Systems: A comprehensive review of systems was negative except for that written in the HPI.     Objective:     Vitals:  Vitals:    07/15/17 2111 07/15/17 2115 07/15/17 2131 07/15/17 2144   BP: 125/84 115/78 114/81 118/86   Pulse: 100 83 (!) 107 89   Resp: Temp:       Height:            Physical Exam:  Patient without distress. Heart: Regular rate and rhythm  Lung: clear to auscultation throughout lung fields, no wheezes, no rales, no rhonchi and normal respiratory effort  Abdomen: soft, nontender  Fundus: soft and non tender  Cervical Exam: 9/100 %/+1/   Membranes:  Spontaneous Rupture of Membranes; Amniotic Fluid: clear fluid  Fetal Heart Rate: Reactive    Prenatal Labs:   Lab Results   Component Value Date/Time    ABO/Rh(D) O POSITIVE 07/15/2017 07:25 PM    Rubella, External immune 12/06/2016    GrBStrep, External negative 06/22/2017    HBsAg, External negative 12/06/2016    HIV, External negative 12/06/2016    RPR, External non reactive 12/06/2016    Gonorrhea, External Negative 11/29/2016    Chlamydia, External Negative 11/29/2016    ABO,Rh O positive 12/06/2016         Assessment/Plan:     Principal Problem:    Normal labor (7/15/2017)    Active Problems:    Normal pregnancy (12/30/2016)      Overview: No hx hbp/dm/dvt hemophillia hsv  Blood dz   Wt goal 30lbs/// 3hgtt=abn 2h       value 5/12  ch 2h post pran in 2-3w         Plan: Admit for Reassuring fetal status, Labor  Progressing normally  Continue expectant management, Continue plan for vaginal delivery. Group B Strep was negative.     Signed By:  Cayla Schulte MD     July 15, 2017

## 2017-07-16 NOTE — PROGRESS NOTES
Fred Villa Meals at bedside at [address] [place] [address]. Assisted pt to sitting up on bedside at [facility]. Timeout completed at 2003  with MD myself at bedside. Test dose given at [facility]. Negative reaction. Dose given at 2003. Pt assisted to lying back in left tilt position. See anesthesia record for details. See vital sign flow sheet for BP. Tolerated procedure well.

## 2017-07-16 NOTE — LACTATION NOTE

## 2017-07-16 NOTE — PROGRESS NOTES
Pt admitted to Room 432. Admission assessment completed. Discussed plan of care with patient. Discussed pt's choice of infant feeding method. Feeding options explored, including the importance of exclusive breastfeeding. Pt informed of our breastfeeding support system from from nursing staff and lactation staff during inpatient stay and following discharge. Questions and concerns addressed at this time. Pt confirms breastfeeding is her decision at this time.

## 2017-07-16 NOTE — ANESTHESIA PREPROCEDURE EVALUATION
Anesthetic History   No history of anesthetic complications            Review of Systems / Medical History  Patient summary reviewed, nursing notes reviewed and pertinent labs reviewed    Pulmonary  Within defined limits                 Neuro/Psych   Within defined limits           Cardiovascular  Within defined limits                Exercise tolerance: >4 METS     GI/Hepatic/Renal     GERD: well controlled           Endo/Other  Within defined limits           Other Findings   Comments: Term preg. , active labor           Physical Exam    Airway  Mallampati: II  TM Distance: 4 - 6 cm  Neck ROM: normal range of motion   Mouth opening: Normal     Cardiovascular    Rhythm: regular           Dental  No notable dental hx       Pulmonary                 Abdominal         Other Findings            Anesthetic Plan    ASA: 2  Anesthesia type: epidural            Anesthetic plan and risks discussed with: Patient and Spouse

## 2017-07-16 NOTE — ANESTHESIA POSTPROCEDURE EVALUATION
Post-Anesthesia Evaluation and Assessment    Patient: Niru Jaime MRN: [id number]  SSN: [id number]    YOB: 1980  Age: 29 y.o. Sex: female       Cardiovascular Function/Vital Signs  Visit Vitals    /79 (BP 1 Location: Right arm, BP Patient Position: At rest)    Pulse 79    Temp 37.1 °C (98.7 °F)    Resp 18    Ht 5' 4\" (1.626 m)    Breastfeeding Unknown       Patient is status post epidural anesthesia for * No procedures listed *. Nausea/Vomiting: None    Postoperative hydration reviewed and adequate. Pain:  Pain Scale 1: Numeric (0 - 10) (07/16/17 0500)  Pain Intensity 1: 0 (07/16/17 0500)   Managed    Neurological Status:   Neuro (WDL): Within Defined Limits (07/16/17 0044)   At baseline    Mental Status and Level of Consciousness: Arousable    Pulmonary Status:   O2 Device: Room air (07/16/17 0500)   Adequate oxygenation and airway patent    Complications related to anesthesia: None    Post-anesthesia assessment completed. No concerns. Epidural cath came dislodged when pushing and did not have time to replace it. The patient denies any complications. Her lower extremities have returned to baseline neurologically.     Signed By: Carter Moe MD     July 16, 2017

## 2017-07-16 NOTE — PROGRESS NOTES
Bedside report received from Star Gorman RN. Care assumed. Pt breastfeeding at this time. Denies needs. Encouraged to call for needs or concerns. Verbalized understanding.

## 2017-07-17 VITALS
HEART RATE: 81 BPM | DIASTOLIC BLOOD PRESSURE: 82 MMHG | TEMPERATURE: 97.9 F | HEIGHT: 64 IN | RESPIRATION RATE: 18 BRPM | SYSTOLIC BLOOD PRESSURE: 128 MMHG

## 2017-07-17 PROCEDURE — 74011250637 HC RX REV CODE- 250/637: Performed by: OBSTETRICS & GYNECOLOGY

## 2017-07-17 PROCEDURE — 76060000078 HC EPIDURAL ANESTHESIA

## 2017-07-17 RX ORDER — HYDROCODONE BITARTRATE AND ACETAMINOPHEN 7.5; 325 MG/1; MG/1
1 TABLET ORAL
Qty: 20 TAB | Refills: 0 | Status: SHIPPED | OUTPATIENT
Start: 2017-07-17 | End: 2017-12-06

## 2017-07-17 RX ORDER — IBUPROFEN 800 MG/1
800 TABLET ORAL
Qty: 90 TAB | Refills: 0 | Status: SHIPPED | OUTPATIENT
Start: 2017-07-17 | End: 2017-12-06

## 2017-07-17 RX ADMIN — IBUPROFEN 800 MG: 800 TABLET ORAL at 10:17

## 2017-07-17 RX ADMIN — IBUPROFEN 800 MG: 800 TABLET ORAL at 03:34

## 2017-07-17 RX ADMIN — DOCUSATE SODIUM 100 MG: 100 CAPSULE, LIQUID FILLED ORAL at 10:17

## 2017-07-17 NOTE — DISCHARGE INSTRUCTIONS
Discharge instruction to follow: Activity: Pelvis rest for 6 weeks     No heavy lifting over 15 lbs for 2 weeks     No driving for 2 weeks     No push/pull motion such as sweeping or vacuuming for 2 weeks     No tub baths for 6 weeks    If using sitz bath continue until comfortable stopping. If using jerzy-bottle continue to use until comfortable stopping. Change sanitary pad after each urination or bowel movement. Call MD for the following:      Fever over 101 F; pain not relieved by medication; foul smelling vaginal discharge or an increase in vaginal bleeding. Take medication as prescribed. Follow up with MD as order. After Your Delivery (the Postpartum Period): Care Instructions  Your Care Instructions  Congratulations on the birth of your baby. Like pregnancy, the  period can be a time of excitement, j luis, and exhaustion. You may look at your wondrous little baby and feel happy. You may also be overwhelmed by your new sleep hours and new responsibilities. At first, babies often sleep during the days and are awake at night. They do not have a pattern or routine. They may make sudden gasps, jerk themselves awake, or look like they have crossed eyes. These are all normal, and they may even make you smile. In these first weeks after delivery, try to take good care of yourself. It may take 4 to 6 weeks to feel like yourself again, and possibly longer if you had a  birth. You will likely feel very tired for several weeks. Your days will be full of ups and downs, but lots of j luis as well. Follow-up care is a key part of your treatment and safety. Be sure to make and go to all appointments, and call your doctor if you are having problems. It's also a good idea to know your test results and keep a list of the medicines you take. How can you care for yourself at home?   Take care of your body after delivery  · Use pads instead of tampons for the bloody flow that may last as long as 2 weeks.  · Ease cramps with ibuprofen (Advil, Motrin). · Ease soreness of hemorrhoids and the area between your vagina and rectum with ice compresses or witch hazel pads. · Ease constipation by drinking lots of fluid and eating high-fiber foods. Ask your doctor about over-the-counter stool softeners. · Cleanse yourself with a gentle squeeze of warm water from a bottle instead of wiping with toilet paper. · Take a sitz bath in warm water several times a day. · Wear a good nursing bra. Ease sore and swollen breasts with warm, wet washcloths. · If you are not breastfeeding, use ice rather than heat for breast soreness. · Your period may not start for several months if you are breastfeeding. You may bleed more, and longer at first, than you did before you got pregnant. · Wait until you are healed (about 4 to 6 weeks) before you have sexual intercourse. Your doctor will tell you when it is okay to have sex. · Try not to travel with your baby for 5 or 6 weeks. If you take a long car trip, make frequent stops to walk around and stretch. Avoid exhaustion  · Rest every day. Try to nap when your baby naps. · Ask another adult to be with you for a few days after delivery. · Plan for  if you have other children. · Stay flexible so you can eat at odd hours and sleep when you need to. Both you and your baby are making new schedules. · Plan small trips to get out of the house. Change can make you feel less tired. · Ask for help with housework, cooking, and shopping. Remind yourself that your job is to care for your baby. Know about help for postpartum depression  · \"Baby blues\" are common for the first 1 to 2 weeks after birth. You may cry or feel sad or irritable for no reason. · Rest whenever you can. Being tired makes it harder to handle your emotions. · Go for walks with your baby. · Talk to your partner, friends, and family about your feelings.   · If your symptoms last for more than a few weeks, or if you feel very depressed, ask your doctor for help. · Postpartum depression can be treated. Support groups and counseling can help. Sometimes medicine can also help. Stay healthy  · Eat healthy foods so you have more energy, make good breast milk, and lose extra baby pounds. · If you breastfeed, avoid alcohol and drugs. Stay smoke-free. If you quit during pregnancy, congratulations. · Start daily exercise after 4 to 6 weeks, but rest when you feel tired. · Learn exercises to tone your belly. Do Kegel exercises to regain strength in your pelvic muscles. You can do these exercises while you stand or sit. ¨ Squeeze the same muscles you would use to stop your urine. Your belly and thighs should not move. ¨ Hold the squeeze for 3 seconds, and then relax for 3 seconds. ¨ Start with 3 seconds. Then add 1 second each week until you are able to squeeze for 10 seconds. ¨ Repeat the exercise 10 to 15 times for each session. Do three or more sessions each day. · Find a class for new mothers and new babies that has an exercise time. · If you had a  birth, give yourself a bit more time before you exercise, and be careful. When should you call for help? Call 911 anytime you think you may need emergency care. For example, call if:  · You passed out (lost consciousness). Call your doctor now or seek immediate medical care if:  · You have severe vaginal bleeding. This means you are passing blood clots and soaking through a pad each hour for 2 or more hours. · You are dizzy or lightheaded, or you feel like you may faint. · You have a fever. · You have new belly pain, or your pain gets worse. Watch closely for changes in your health, and be sure to contact your doctor if:  · Your vaginal bleeding seems to be getting heavier. · You have new or worse vaginal discharge. · You feel sad, anxious, or hopeless for more than a few days. · You do not get better as expected. Where can you learn more?   Go to http://lexa-kamari.info/. Enter A461 in the search box to learn more about \"After Your Delivery (the Postpartum Period): Care Instructions. \"  Current as of: March 16, 2017  Content Version: 11.3  © 8262-9619 [employer]. Care instructions adapted under license by [employer] (which disclaims liability or warranty for this information). If you have questions about a medical condition or this instruction, always ask your healthcare professional. [place] [address] any warranty or liability for your use of this information. DISCHARGE SUMMARY from Nurse    The following personal items are in your possession at time of discharge:    Dental Appliances: None  Visual Aid: Contacts     Home Medications: None  Jewelry: Necklace, Earrings  Clothing: Pants, Footwear, Shirt  Other Valuables: Purse             PATIENT INSTRUCTIONS:    After general anesthesia or intravenous sedation, for 24 hours or while taking prescription Narcotics:  · Limit your activities  · Do not drive and operate hazardous machinery  · Do not make important personal or business decisions  · Do  not drink alcoholic beverages  · If you have not urinated within 8 hours after discharge, please contact your surgeon on call. Report the following to your surgeon:  · Excessive pain, swelling, redness or odor of or around the surgical area  · Temperature over 100.5  · Nausea and vomiting lasting longer than 4 hours or if unable to take medications  · Any signs of decreased circulation or nerve impairment to extremity: change in color, persistent  numbness, tingling, coldness or increase pain  · Any questions        What to do at Home:    *  Please give a list of your current medications to your Primary Care Provider. *  Please update this list whenever your medications are discontinued, doses are      changed, or new medications (including over-the-counter products) are added.     *  Please carry medication information at all times in case of emergency situations. These are general instructions for a healthy lifestyle:    No smoking/ No tobacco products/ Avoid exposure to second hand smoke    Surgeon General's Warning:  Quitting smoking now greatly reduces serious risk to your health. Obesity, smoking, and sedentary lifestyle greatly increases your risk for illness    A healthy diet, regular physical exercise & weight monitoring are important for maintaining a healthy lifestyle    You may be retaining fluid if you have a history of heart failure or if you experience any of the following symptoms:  Weight gain of 3 pounds or more overnight or 5 pounds in a week, increased swelling in our hands or feet or shortness of breath while lying flat in bed. Please call your doctor as soon as you notice any of these symptoms; do not wait until your next office visit. Recognize signs and symptoms of STROKE:    F-face looks uneven    A-arms unable to move or move unevenly    S-speech slurred or non-existent    T-time-call 911 as soon as signs and symptoms begin-DO NOT go       Back to bed or wait to see if you get better-TIME IS BRAIN. Warning Signs of HEART ATTACK     Call 911 if you have these symptoms:   Chest discomfort. Most heart attacks involve discomfort in the center of the chest that lasts more than a few minutes, or that goes away and comes back. It can feel like uncomfortable pressure, squeezing, fullness, or pain.  Discomfort in other areas of the upper body. Symptoms can include pain or discomfort in one or both arms, the back, neck, jaw, or stomach.  Shortness of breath with or without chest discomfort.  Other signs may include breaking out in a cold sweat, nausea, or lightheadedness. Don't wait more than five minutes to call 911 - MINUTES MATTER! Fast action can save your life. Calling 911 is almost always the fastest way to get lifesaving treatment.  Emergency Medical Services staff can begin treatment when they arrive -- up to an hour sooner than if someone gets to the hospital by car. The discharge information has been reviewed with the patient. The patient verbalized understanding. Discharge medications reviewed with the patient and appropriate educational materials and side effects teaching were provided.

## 2017-07-17 NOTE — LACTATION NOTE
Mom requesting early discharge today. Continue to offer the breast without restriction. Mom's milk should be fully in over the next few days. Reviewed engorgement precautions. Hand Expression has been demoed and written hand-out reviewed. As milk comes in baby will be more alert at the breast and swallows will be more obvious. Breasts may feel softer once baby has finished nursing. Baby should be back to birth weight by 3weeks of age. And then gain on average 1 oz per day for the next 2-3 months. Reviewed babies should be exclusively breastfeeding for the first 6 months and that breastfeeding should continue after introduction of appropriate complimentary foods after 6 months. Initial output should be at least 1 wet and 1 bowel movement for each day old baby is. By day 5-7 once milk is fully in baby will consistently have 6 or more soaking wet diapers and about 4 bowel movement. Some babies have a bowel movement with every feeding and some have 1-3 large bowel movements each day. Inadequate output may indicate inadequate feedings and should be reported to your Pediatrician. Bowel habits may change as baby gets older. Encouraged follow-up at Pediatrician in 1-2 days, no later than 1 week of age. Call [facility] for any questions as needed or to set up an OP visit. OP phone calls are returned within 24 hours. Breastfeeding Support Group is offered here monthly. Community Breastfeeding Resource List given.

## 2017-07-17 NOTE — PROGRESS NOTES
Report received from Mikki Bernard RN, and care assumed. Bedside report completed. Pt denies any needs at present.

## 2017-07-17 NOTE — LACTATION NOTE
Mom requesting early discharge today. Mom reports infant cluster fed overnight. Mom states nipples are sore but no visible damage. Infant showing feeding cues. Reviewed hand expression. Observed latch to left breast in cross cradle position. Good latch. Demonstrated manual lip flange. Questions answered regarding pump rental. Mom to call for lactation if rental pump desired.

## 2017-07-17 NOTE — PROGRESS NOTES
Chart reviewed due to first time parent - no needs identified.  met with family and provided education on [facility] Postpartum [place] Home Visit Program.  Family declined referral for home visit.     Kim Ferrer, [address] [facility]

## 2017-07-17 NOTE — PROGRESS NOTES
Progress Note                               Patient: Phyllis Neri MRN: [id number]  SSN: [id number]    YOB: 1980  Age: 29 y.o. Sex: female      Postpartum Day Number 1    Subjective:     Patient doing well postpartum without significant complaints. Voiding without difficulty. Patient reports normal lochia. .  Pain well controlled, voiding on her own without difficulty. Breast feeding. Denies HA, SOB/CP, RUQ pain, Vision changes. Objective:     Patient Vitals for the past 12 hrs:   Temp Pulse Resp BP   17 0740 97.9 °F (36.6 °C) 81 18 128/82       Temp (24hrs), Av.9 °F (36.6 °C), Min:97.8 °F (36.6 °C), Max:97.9 °F (36.6 °C)      Physical Exam:    Constitutional: She appears well-developed and well-nourished. No distress. HENT:    Head: Normocephalic and atraumatic. Cardiovascular: RRR  Pulmonary/Chest: CTAB  Abd:  S/NTTP/ND, BS normoactive, fundus firm at umbilicus  Ext:  No c/c/e      Lab/Data Review:  CBC:    Recent Labs      07/15/17   1925   WBC  12.0*   HGB  12.5   HCT  36.4   PLT  262     GBS:   Lab Results   Component Value Date/Time    GrBStrep, External negative 2017     Blood Type:   Lab Results   Component Value Date/Time    ABO/Rh(D) O POSITIVE 07/15/2017 07:25 PM    ABO,Rh O positive 2016        Assessment and Plan:     29 y.o.  ppd# 1 s/p :    1) PP:  Meeting all pp goals, continue routine care; appropriate for DC home today and requests early DC. 2) Breast feeding, Rh pos, Rub imm  3) Elev bps: Few mild bps on intake and some severes w/ pushing, but normotensive now, no si/sx PreE.   HELLP labs wnl (), CBC wnl on intake        Signed By: Shaunna Hardwick MD     2017

## 2017-07-17 NOTE — PROGRESS NOTES
Patient discharged to home after ID bands verified and 's code alert removed. Discharge teaching complete, patient verbalizes understanding; questions encouraged. Infant placed in car seat correctly. Stable at discharge.

## 2017-07-17 NOTE — PROGRESS NOTES
Shift assessment completed. Questions encouraged and answered. Pt denies needs at this time. Encouraged to call for needs or concerns. Verbalized understanding. Pt states she would like to discharge tomorrow.  Pt instructed to discuss with MD in AM.

## 2017-07-17 NOTE — PROGRESS NOTES
Obstetrical Discharge Summary     Name: Eloina Rosales MRN: [id number]  SSN: [id number]    YOB: 1980  Age: 29 y.o. Sex: female      Admit Date: 7/15/2017    Discharge Date: 2017     Admitting Physician: Quoc Brown MD     Attending Physician:  Ranulfo Corey MD     * Admission Diagnoses: LABOR;Normal labor    * Discharge Diagnoses:   Information for the patient's :  Yina Moreira [[id number]]   Delivery of a 7 lb 14 oz (3.572 kg) female infant via Vaginal, Spontaneous Delivery on 2017 at 12:35 AM  by . Apgars were 9 and 9. Additional Diagnoses:   Hospital Problems as of 2017  Date Reviewed: 7/15/2017          Codes Class Noted - Resolved POA    * (Principal)Normal labor ICD-10-CM: O80, Z37.9  ICD-9-CM: 681  7/15/2017 - Present Yes        Normal pregnancy ICD-10-CM: Z34.90  ICD-9-CM: V22.2  2016 - Present Yes    Overview Addendum 5/15/2017  9:40 AM by Eva Pagan MD     No hx hbp/dm/dvt hemophillia hsv  Blood dz   Wt goal 30lbs/// 3hgtt=abn 2h value   ch 2h post pran in 2-3w                  Lab Results   Component Value Date/Time    ABO/Rh(D) O POSITIVE 07/15/2017 07:25 PM    Rubella, External immune 2016    GrBStrep, External negative 2017    ABO,Rh O positive 2016      There is no immunization history on file for this patient. * Procedures:          * Discharge Condition: good    * Hospital Course: Normal hospital course following the delivery. * Disposition: Home    Discharge Medications:   Current Discharge Medication List      START taking these medications    Details   HYDROcodone-acetaminophen (NORCO) 7.5-325 mg per tablet Take 1 Tab by mouth every six (6) hours as needed. Max Daily Amount: 4 Tabs. Qty: 20 Tab, Refills: 0      ibuprofen (MOTRIN) 800 mg tablet Take 1 Tab by mouth every six (6) hours as needed.   Qty: 90 Tab, Refills: 0         CONTINUE these medications which have NOT CHANGED    Details Iron, Cbn & Gluc-FA-B12-C-DSS (FERRALET 90 DUAL-IRON DELIVERY) 90-1-12-50 mg-mg-mcg-mg tab Take 1 Tab by mouth daily. Qty: 90 Tab, Refills: 2      doxylamine-pyridoxine (DICLEGIS) 10-10 mg TbEC Take 2 Each by mouth nightly as needed. 2 po qhs  Qty: 60 Tab, Refills: 11      OTHER              * Follow-up Care/Patient Instructions:   Activity: No sex for 6 weeks, No driving while on analgesics and No heavy lifting for 6 weeks  Diet: Regular Diet  Wound Care: None needed    Follow-up Information     Follow up With Details Comments Contact Info    None   None ([phone]) Patient stated that they have no PCP             Signed By:  Osmany Gallego MD     July 17, 2017

## 2017-07-17 NOTE — LACTATION NOTE
Mom has decided to rent hospital pump for discharge. Pump and pump kit provided with full instructions for use, collection, and cleaning. Straight and curve tip syringes provided with instructions for use. Discharge information reviewed. Paperwork completed. Encouraged to call with needs or concerns.

## 2017-08-28 PROBLEM — Z37.9 NORMAL LABOR: Status: RESOLVED | Noted: 2017-07-15 | Resolved: 2017-08-28

## 2021-07-29 PROBLEM — O09.519 ADVANCED MATERNAL AGE, 1ST PREGNANCY: Status: ACTIVE | Noted: 2021-07-29

## 2021-08-20 PROBLEM — D50.9 IRON DEFICIENCY ANEMIA: Status: RESOLVED | Noted: 2017-04-28 | Resolved: 2021-08-20

## 2021-10-10 PROBLEM — O09.512 PRIMIGRAVIDA OF ADVANCED MATERNAL AGE IN SECOND TRIMESTER: Status: ACTIVE | Noted: 2021-07-29

## 2021-10-11 PROBLEM — O09.522 MULTIGRAVIDA OF ADVANCED MATERNAL AGE IN SECOND TRIMESTER: Status: ACTIVE | Noted: 2021-07-29

## 2021-10-11 PROBLEM — Z14.8 CARRIER OF SPINAL MUSCULAR ATROPHY: Status: ACTIVE | Noted: 2021-10-11

## 2021-11-19 PROBLEM — O21.9 NAUSEA/VOMITING IN PREGNANCY: Status: ACTIVE | Noted: 2021-11-19

## 2021-12-14 PROBLEM — O09.523 MULTIGRAVIDA OF ADVANCED MATERNAL AGE IN THIRD TRIMESTER: Status: ACTIVE | Noted: 2021-07-29

## 2021-12-15 PROBLEM — O21.9 NAUSEA/VOMITING IN PREGNANCY: Status: RESOLVED | Noted: 2021-11-19 | Resolved: 2021-12-15

## 2022-01-24 ENCOUNTER — HOSPITAL ENCOUNTER (OUTPATIENT)
Age: 42
Setting detail: OBSERVATION
Discharge: HOME | End: 2022-01-25
Attending: OBSTETRICS & GYNECOLOGY | Admitting: OBSTETRICS & GYNECOLOGY
Payer: COMMERCIAL

## 2022-01-24 DIAGNOSIS — O16.3 HYPERTENSION AFFECTING PREGNANCY IN THIRD TRIMESTER: ICD-10-CM

## 2022-01-24 PROBLEM — U07.1 COVID-19 AFFECTING PREGNANCY IN THIRD TRIMESTER: Status: ACTIVE | Noted: 2022-01-24

## 2022-01-24 PROBLEM — O98.513 COVID-19 AFFECTING PREGNANCY IN THIRD TRIMESTER: Status: ACTIVE | Noted: 2022-01-24

## 2022-01-24 PROBLEM — O13.9 GESTATIONAL HYPERTENSION: Status: ACTIVE | Noted: 2022-01-24

## 2022-01-24 LAB
ALBUMIN SERPL-MCNC: 2.4 G/DL (ref 3.5–5)
ALBUMIN/GLOB SERPL: 0.6 {RATIO} (ref 1.2–3.5)
ALP SERPL-CCNC: 129 U/L (ref 50–130)
ALT SERPL-CCNC: 17 U/L (ref 12–65)
ANION GAP SERPL CALC-SCNC: 4 MMOL/L (ref 7–16)
AST SERPL-CCNC: 15 U/L (ref 15–37)
BASOPHILS # BLD: 0 K/UL (ref 0–0.2)
BASOPHILS NFR BLD: 0 % (ref 0–2)
BILIRUB SERPL-MCNC: 0.2 MG/DL (ref 0.2–1.1)
BUN SERPL-MCNC: 8 MG/DL (ref 6–23)
CALCIUM SERPL-MCNC: 9.3 MG/DL (ref 8.3–10.4)
CHLORIDE SERPL-SCNC: 105 MMOL/L (ref 98–107)
CO2 SERPL-SCNC: 28 MMOL/L (ref 21–32)
CREAT SERPL-MCNC: 0.63 MG/DL (ref 0.6–1)
CREAT UR-MCNC: 44 MG/DL
DIFFERENTIAL METHOD BLD: ABNORMAL
EOSINOPHIL # BLD: 0.1 K/UL (ref 0–0.8)
EOSINOPHIL NFR BLD: 1 % (ref 0.5–7.8)
ERYTHROCYTE [DISTWIDTH] IN BLOOD BY AUTOMATED COUNT: 13.7 % (ref 11.9–14.6)
GLOBULIN SER CALC-MCNC: 4.2 G/DL (ref 2.3–3.5)
GLUCOSE SERPL-MCNC: 97 MG/DL (ref 65–100)
HCT VFR BLD AUTO: 31.1 % (ref 35.8–46.3)
HGB BLD-MCNC: 10 G/DL (ref 11.7–15.4)
IMM GRANULOCYTES # BLD AUTO: 0.1 K/UL (ref 0–0.5)
IMM GRANULOCYTES NFR BLD AUTO: 1 % (ref 0–5)
LDH SERPL L TO P-CCNC: 188 U/L (ref 100–190)
LYMPHOCYTES # BLD: 1.5 K/UL (ref 0.5–4.6)
LYMPHOCYTES NFR BLD: 14 % (ref 13–44)
MCH RBC QN AUTO: 28.6 PG (ref 26.1–32.9)
MCHC RBC AUTO-ENTMCNC: 32.2 G/DL (ref 31.4–35)
MCV RBC AUTO: 88.9 FL (ref 79.6–97.8)
MONOCYTES # BLD: 0.6 K/UL (ref 0.1–1.3)
MONOCYTES NFR BLD: 6 % (ref 4–12)
NEUTS SEG # BLD: 7.9 K/UL (ref 1.7–8.2)
NEUTS SEG NFR BLD: 78 % (ref 43–78)
NRBC # BLD: 0 K/UL (ref 0–0.2)
PLATELET # BLD AUTO: 226 K/UL (ref 150–450)
PMV BLD AUTO: 10.2 FL (ref 9.4–12.3)
POTASSIUM SERPL-SCNC: 3.9 MMOL/L (ref 3.5–5.1)
PROT SERPL-MCNC: 6.6 G/DL (ref 6.3–8.2)
PROT UR-MCNC: 10 MG/DL
PROT/CREAT UR-RTO: 0.2
RBC # BLD AUTO: 3.5 M/UL (ref 4.05–5.2)
SODIUM SERPL-SCNC: 137 MMOL/L (ref 136–145)
URATE SERPL-MCNC: 4.6 MG/DL (ref 2.6–6)
WBC # BLD AUTO: 10.2 K/UL (ref 4.3–11.1)

## 2022-01-24 PROCEDURE — G0378 HOSPITAL OBSERVATION PER HR: HCPCS

## 2022-01-24 PROCEDURE — 65270000029 HC RM PRIVATE

## 2022-01-24 PROCEDURE — 80053 COMPREHEN METABOLIC PANEL: CPT

## 2022-01-24 PROCEDURE — 74011250636 HC RX REV CODE- 250/636: Performed by: OBSTETRICS & GYNECOLOGY

## 2022-01-24 PROCEDURE — 83615 LACTATE (LD) (LDH) ENZYME: CPT

## 2022-01-24 PROCEDURE — 84550 ASSAY OF BLOOD/URIC ACID: CPT

## 2022-01-24 PROCEDURE — 74011250637 HC RX REV CODE- 250/637: Performed by: OBSTETRICS & GYNECOLOGY

## 2022-01-24 PROCEDURE — 99218 PR INITIAL OBSERVATION CARE/DAY 30 MINUTES: CPT | Performed by: OBSTETRICS & GYNECOLOGY

## 2022-01-24 PROCEDURE — 84156 ASSAY OF PROTEIN URINE: CPT

## 2022-01-24 PROCEDURE — 85025 COMPLETE CBC W/AUTO DIFF WBC: CPT

## 2022-01-24 PROCEDURE — 96372 THER/PROPH/DIAG INJ SC/IM: CPT

## 2022-01-24 RX ORDER — BETAMETHASONE SODIUM PHOSPHATE AND BETAMETHASONE ACETATE 3; 3 MG/ML; MG/ML
12 INJECTION, SUSPENSION INTRA-ARTICULAR; INTRALESIONAL; INTRAMUSCULAR; SOFT TISSUE EVERY 24 HOURS
Status: COMPLETED | OUTPATIENT
Start: 2022-01-24 | End: 2022-01-25

## 2022-01-24 RX ORDER — CALCIUM CARBONATE 200(500)MG
400 TABLET,CHEWABLE ORAL
Status: DISCONTINUED | OUTPATIENT
Start: 2022-01-24 | End: 2022-01-26 | Stop reason: HOSPADM

## 2022-01-24 RX ADMIN — CALCIUM CARBONATE 400 MG: 500 TABLET, CHEWABLE ORAL at 21:57

## 2022-01-24 RX ADMIN — BETAMETHASONE SODIUM PHOSPHATE AND BETAMETHASONE ACETATE 12 MG: 3; 3 INJECTION, SUSPENSION INTRA-ARTICULAR; INTRALESIONAL; INTRAMUSCULAR at 17:26

## 2022-01-24 NOTE — PROGRESS NOTES
Pt direct admit from [facility] for Pre-E r/o. Labs collected and sent. Admission assessment completed. Celestone given as ordered. Pt oriented to room and call system, I&O documentation.

## 2022-01-25 VITALS
TEMPERATURE: 97.9 F | BODY MASS INDEX: 29.37 KG/M2 | HEIGHT: 64 IN | RESPIRATION RATE: 16 BRPM | WEIGHT: 172 LBS | SYSTOLIC BLOOD PRESSURE: 127 MMHG | HEART RATE: 99 BPM | OXYGEN SATURATION: 99 % | DIASTOLIC BLOOD PRESSURE: 71 MMHG

## 2022-01-25 LAB
COLLECT DURATION TIME UR: 24 HR
PROT 24H UR-MRATE: 382 MG/24HR
PROT UR-MCNC: 7 MG/DL
SPECIMEN VOL ?TM UR: 5450 ML

## 2022-01-25 PROCEDURE — 99217 PR OBSERVATION CARE DISCHARGE MANAGEMENT: CPT | Performed by: OBSTETRICS & GYNECOLOGY

## 2022-01-25 PROCEDURE — 96372 THER/PROPH/DIAG INJ SC/IM: CPT

## 2022-01-25 PROCEDURE — 74011250636 HC RX REV CODE- 250/636: Performed by: OBSTETRICS & GYNECOLOGY

## 2022-01-25 PROCEDURE — 59025 FETAL NON-STRESS TEST: CPT | Performed by: OBSTETRICS & GYNECOLOGY

## 2022-01-25 PROCEDURE — 59025 FETAL NON-STRESS TEST: CPT

## 2022-01-25 PROCEDURE — G0378 HOSPITAL OBSERVATION PER HR: HCPCS

## 2022-01-25 RX ADMIN — BETAMETHASONE SODIUM PHOSPHATE AND BETAMETHASONE ACETATE 12 MG: 3; 3 INJECTION, SUSPENSION INTRA-ARTICULAR; INTRALESIONAL; INTRAMUSCULAR at 17:04

## 2022-01-25 NOTE — PROGRESS NOTES
POC reviewed with Dr Kenzie Plascencia. 24 hour urine collection begun and explained to pt. Verbalized understanding.

## 2022-01-25 NOTE — H&P
History & Physical    Name: Coco Nguyen MRN: [id number]  SSN: [id number]    YOB: 1980  Age: 37 y.o. Sex: female      Subjective:     Reason for Admission:  Pregnancy and elevated bp in [facility] office    History of Present Illness: Ms. Stephany Rodarte is a 37 y.o.  female with an estimated gestational age of 35w2d with Estimated Date of Delivery: 3/5/22. Patient was seen for scheduled appt at [facility], had an elevated bp, was sent for bp cks and preE labs by Dr Lionel Apgar. See Dr Rehana Ruby note from today. OB History    Para Term  AB Living   2 1 1     1   SAB IAB Ectopic Molar Multiple Live Births           0 1      # Outcome Date GA Lbr Avni/2nd Weight Sex Delivery Anes PTL Lv   2 Current            1 Term 17 39w5d 03:17 :48 7 lb 14 oz (3.572 kg) F Vag-Spont EPIDURAL AN N GIANFRANCO     Past Medical History:   Diagnosis Date    Gestational hypertension 2022    Iron deficiency anemia 2017    Nausea/vomiting in pregnancy 2021    - till 26wks - zofran helps, needs daily or every other day      Past Surgical History:   Procedure Laterality Date    HX WISDOM TEETH EXTRACTION       Social History     Occupational History    Not on file   Tobacco Use    Smoking status: Never Smoker    Smokeless tobacco: Never Used   Substance and Sexual Activity    Alcohol use: No    Drug use: No    Sexual activity: Yes     Partners: Male     Birth control/protection: None      Family History   Problem Relation Age of Onset    Ovarian Cancer Mother 43    Cancer Mother         uterine    Colon Cancer Neg Hx     Breast Cancer Neg Hx        Allergies   Allergen Reactions    Augmentin [Amoxicillin-Pot Clavulanate] Hives     Prior to Admission medications    Medication Sig Start Date End Date Taking? Authorizing Provider   magnesium 250 mg tab Take  by mouth.     Provider, Historical   ondansetron (Zofran ODT) 4 mg disintegrating tablet Take 1 Tablet by mouth every six (6) hours as needed for Nausea. Indications: excessive vomiting in pregnancy  Patient not taking: Reported on 2021   Magnus Giraldo MD   calcium carbonate (TUMS) 200 mg calcium (500 mg) chew Take 1 Tablet by mouth daily. Provider, Historical   prenatal 168/iron/folic/omega3 (ONE-A-DAY PRENATAL-1 PO) Take  by mouth. Provider, Historical   doxylamine succinate (UNISOM) 25 mg tablet Take 25 mg by mouth nightly as needed. Patient not taking: Reported on 2022    Provider, Historical   pyridoxine, vitamin B6, (Vitamin B-6) 25 mg tablet Take 25 mg by mouth daily. Patient not taking: Reported on 2022    Provider, Historical        Review of Systems:  Pertinent items are noted in the History of Present Illness. Objective:     Vitals:    Vitals:    22 1739 22 1808 22 1838 22 1908   BP: 131/81 124/79 138/88 (!) 140/86   Pulse: 83 78 89 96   Resp:       Temp:       SpO2:       Weight:       Height:          Temp (24hrs), Av °F (36.7 °C), Min:98 °F (36.7 °C), Max:98 °F (36.7 °C)    BP  Min: 124/79  Max: 165/95     Physical Exam:  Patient without distress.   Heart: Regular rate and rhythm  Lung: clear to auscultation throughout lung fields, no wheezes, no rales, no rhonchi and normal respiratory effort     Membranes:  Intact  Baby not currently on monitor      Lab/Data Review:  Recent Results (from the past 24 hour(s))   CBC WITH AUTOMATED DIFF    Collection Time: 22  5:15 PM   Result Value Ref Range    WBC 10.2 4.3 - 11.1 K/uL    RBC 3.50 (L) 4.05 - 5.2 M/uL    HGB 10.0 (L) 11.7 - 15.4 g/dL    HCT 31.1 (L) 35.8 - 46.3 %    MCV 88.9 79.6 - 97.8 FL    MCH 28.6 26.1 - 32.9 PG    MCHC 32.2 31.4 - 35.0 g/dL    RDW 13.7 11.9 - 14.6 %    PLATELET 803 390 - 737 K/uL    MPV 10.2 9.4 - 12.3 FL    ABSOLUTE NRBC 0.00 0.0 - 0.2 K/uL    DF AUTOMATED      NEUTROPHILS 78 43 - 78 %    LYMPHOCYTES 14 13 - 44 %    MONOCYTES 6 4.0 - 12.0 %    EOSINOPHILS 1 0.5 - 7.8 %    BASOPHILS 0 0.0 - 2.0 % IMMATURE GRANULOCYTES 1 0.0 - 5.0 %    ABS. NEUTROPHILS 7.9 1.7 - 8.2 K/UL    ABS. LYMPHOCYTES 1.5 0.5 - 4.6 K/UL    ABS. MONOCYTES 0.6 0.1 - 1.3 K/UL    ABS. EOSINOPHILS 0.1 0.0 - 0.8 K/UL    ABS. BASOPHILS 0.0 0.0 - 0.2 K/UL    ABS. IMM. GRANS. 0.1 0.0 - 0.5 K/UL   METABOLIC PANEL, COMPREHENSIVE    Collection Time: 01/24/22  5:21 PM   Result Value Ref Range    Sodium 137 136 - 145 mmol/L    Potassium 3.9 3.5 - 5.1 mmol/L    Chloride 105 98 - 107 mmol/L    CO2 28 21 - 32 mmol/L    Anion gap 4 (L) 7 - 16 mmol/L    Glucose 97 65 - 100 mg/dL    BUN 8 6 - 23 MG/DL    Creatinine 0.63 0.6 - 1.0 MG/DL    GFR est AA >60 >60 ml/min/1.73m2    GFR est non-AA >60 >60 ml/min/1.73m2    Calcium 9.3 8.3 - 10.4 MG/DL    Bilirubin, total 0.2 0.2 - 1.1 MG/DL    ALT (SGPT) 17 12 - 65 U/L    AST (SGOT) 15 15 - 37 U/L    Alk. phosphatase 129 50 - 130 U/L    Protein, total 6.6 6.3 - 8.2 g/dL    Albumin 2.4 (L) 3.5 - 5.0 g/dL    Globulin 4.2 (H) 2.3 - 3.5 g/dL    A-G Ratio 0.6 (L) 1.2 - 3.5     PROTEIN/CREATININE RATIO, URINE    Collection Time: 01/24/22  5:21 PM   Result Value Ref Range    Protein, urine random 10 mg/dL    Creatinine, urine 44.00 mg/dL    Protein/Creat. urine Ratio 0.2         Assessment and Plan:     Pt's labs and bp's here thus far are normal. She ended up being admitted to observation. So will get a 24hr urine.

## 2022-01-25 NOTE — PROGRESS NOTES
Shift assesssment complete. Pt resting quietly in bed eating. Denies needs, questions, concerns. See flowsheets for care details.

## 2022-01-25 NOTE — PROGRESS NOTES
01/25/22 0452 01/25/22 0453   Maternal Vital Signs   Temp  --  97.8 °F (36.6 °C)   Temp Source  --  Oral   Pulse (Heart Rate) 75 88   /64 122/74   MAP (Monitor) 83 92   MAP (Calculated) 80 90   BP 1 Method Automatic Automatic   BP 1 Location Left upper arm Left upper arm   BP Patient Position At rest Sitting   RN @ bedside. Ice and water taken to bedside and to 24 hour urine. VSS. PT denies additional needs at this time. Encouraged to call out PRN. Voiced understanding.

## 2022-01-25 NOTE — PROGRESS NOTES
NST:     GA: 34w3d  Indication: HTN in pregnancy  Baseline: 130  Variability: moderate   Accels: yes 15x15  Decels: no   Contractions: none  Impression: Reactive     Alba Rivera DO

## 2022-01-25 NOTE — PROGRESS NOTES
01/24/22 2109   Fetal Vital Signs   Mode External   Fetal Heart Rate 130   Variability 6-25 BPM   Decelerations None   Accelerations Yes   Non Stress Test Reactive   Uterine Activity   Mode External   Frequency (min) x3   Intensity Mild   Uterine Resting Tone Relaxed

## 2022-01-26 NOTE — DISCHARGE INSTRUCTIONS
Weeks 34 to 36 of Your Pregnancy: Care Instructions  Overview     By now, your baby and your belly have grown quite large. It's almost time to give birth! Your baby's lungs are almost ready to breathe air. The skull bones are firm enough to protect your baby's head, but soft enough to move down through the birth canal.  You may be feeling excited and happy at times--but also anxious or scared. You might wonder how you'll know if you're in labor or what to expect during labor. Try to be open and flexible in your expectations of the birth. Because each birth is different, there's no way to know exactly what childbirth will be like for you. Talk to your doctor or midwife about any concerns you have. If you haven't already had the Tdap shot during this pregnancy, talk to your doctor about getting it. It will help protect your  against pertussis infection. In the 36th week, you'll probably have a test for group B streptococcus (GBS). GBS is a common type of bacteria that can live in the vagina and rectum. It can make your baby sick after birth. If you test positive, you will get antibiotics during labor. The medicine will help keep your baby from getting the bacteria. Follow-up care is a key part of your treatment and safety. Be sure to make and go to all appointments, and call your doctor if you are having problems. It's also a good idea to know your test results and keep a list of the medicines you take. How can you care for yourself at home? Learn about pain relief choices  · Pain is different for everyone. Talk with your doctor about your feelings about pain. · You can choose from several types of pain relief. These include medicine, breathing techniques, and comfort measures. You can use more than one option. · If you choose to have pain medicine during labor, talk to your doctor about your options. Some medicines lower anxiety and help with some of the pain.  Others make your lower body numb so that you won't feel pain. · Be sure to tell your doctor about your pain medicine choice before you start labor or very early in your labor. You may be able to change your mind as labor progresses. Labor and delivery  · The first stage of labor has three parts: early, active, and transition. ? It's common to have early labor at home. You can stay busy or rest, eat light snacks, drink clear fluids, and start counting contractions. ? When talking during a contraction gets hard, you may be moving to active labor. During active labor, you should head for the hospital if you aren't there already. ? You are in active labor when contractions come every 3 to 4 minutes and last about 60 seconds. Your cervix is opening more rapidly. ? If your water breaks, contractions will come faster and stronger. ? During transition, your cervix is stretching, and contractions are coming more rapidly. ? You may want to push, but your cervix might not be ready. Your doctor will tell you when to push. · The second stage starts when your cervix is completely opened and you are ready to push. ? Contractions are very strong to push the baby down the birth canal.  ? You will probably feel the urge to push. You may feel like you need to have a bowel movement. ? You may be coached to push with contractions. These contractions will be very strong, but you won't have them as often. You can get a little rest between contractions. ? One last push, and your baby is born. · The third stage is when a few more contractions push out the placenta. This may take 30 minutes or less. Where can you learn more? Go to http://www.gray.com/  Enter B912 in the search box to learn more about \"Weeks 34 to 36 of Your Pregnancy: Care Instructions. \"  Current as of: June 16, 2021               Content Version: 13.0  © 1525-3239 [employer].    Care instructions adapted under license by [employer] (which disclaims liability or warranty for this information). If you have questions about a medical condition or this instruction, always ask your healthcare professional. [address] any warranty or liability for your use of this information. Preeclampsia: Care Instructions  Overview     Preeclampsia occurs when a woman's blood pressure rises during pregnancy. Often with preeclampsia, you also have swelling in your legs, hands, and face. A test may show too much protein in your urine. If preeclampsia is severe and not treated, it can lead to seizures (eclampsia) and damage to your liver or kidneys. Preeclampsia can prevent your baby from getting enough food and oxygen. This can cause a low birth weight or other problems. Your doctor will watch you closely to prevent these problems. Your doctor also may recommend that you reduce your activity. If your preeclampsia is a danger to your health or the health of your baby, your doctor may need to deliver your baby early. While preeclampsia is a concern, most women with preeclampsia have healthy babies. After a woman gives birth, preeclampsia usually goes away on its own. But symptoms may last a few weeks or more and can get worse after delivery. Rarely, symptoms of preeclampsia don't show up until days or even weeks after childbirth. Follow-up care is a key part of your treatment and safety. Be sure to make and go to all appointments, and call your doctor if you are having problems. It's also a good idea to know your test results and keep a list of the medicines you take. How can you care for yourself at home? · Take and record your blood pressure at home if your doctor tells you to. ? Ask your doctor to check your blood pressure monitor to be sure that it is accurate and that the cuff fits you. Also ask your doctor to watch you to make sure that you are using it right.   ? You should not eat, use tobacco products, or use medicine known to raise blood pressure (such as some nasal decongestant sprays) before you take your blood pressure. ? Avoid taking your blood pressure if you have just exercised. Also avoid taking it if you are nervous or upset. Rest at least 15 minutes before you take your blood pressure. · You may need to take medicine to manage your blood pressure. Take your medicines exactly as prescribed. Call your doctor if you think you are having a problem with your medicine. · Do not smoke. Quitting smoking will help improve your baby's growth and health. If you need help quitting, talk to your doctor about stop-smoking programs and medicines. These can increase your chances of quitting for good. · Eat a balanced and healthy diet that has lots of fruits and vegetables. · You can keep track of your baby's health by checking your baby's movement. A common method for this is to note the length of time it takes to count 10 movements (such as kicks, flutters, or rolls). Call your doctor if you don't feel at least 10 movements in a 2-hour period. Track your baby's movements once each day. Bring this record with you to each prenatal visit. When should you call for help? Share this information with your partner or a friend. They can help you watch for warning signs. Call 911  anytime you think you may need emergency care. For example, call if:    · You passed out (lost consciousness).     · You have a seizure. Call your doctor now or seek immediate medical care if:    · You have symptoms of preeclampsia, such as:  ? Sudden swelling of your face, hands, or feet. ? New vision problems (such as dimness, blurring, or seeing spots). ? A severe headache.     · Your blood pressure is very high, such as 160/110 or higher.     · Your blood pressure is higher than your doctor told you it should be, or it rises quickly.     · You have new nausea or vomiting.     · You think that you are in labor.     · You have pain in your belly or pelvis.    Watch closely for changes in your health, and be sure to contact your doctor if:    · You gain weight rapidly. Where can you learn more? Go to http://www.gray.com/  Enter Z954 in the search box to learn more about \"Preeclampsia: Care Instructions. \"  Current as of: June 16, 2021               Content Version: 13.0  © 4362-9893 [employer]. Care instructions adapted under license by [employer] (which disclaims liability or warranty for this information). If you have questions about a medical condition or this instruction, always ask your healthcare professional. [address] any warranty or liability for your use of this information.

## 2022-01-27 PROBLEM — O14.90 PREECLAMPSIA: Status: ACTIVE | Noted: 2022-01-24

## 2022-01-27 PROBLEM — O13.3 GESTATIONAL HYPERTENSION, THIRD TRIMESTER: Status: ACTIVE | Noted: 2022-01-24

## 2022-02-12 ENCOUNTER — ANESTHESIA EVENT (OUTPATIENT)
Dept: LABOR AND DELIVERY | Age: 42
End: 2022-02-12
Payer: COMMERCIAL

## 2022-02-12 ENCOUNTER — HOSPITAL ENCOUNTER (INPATIENT)
Age: 42
LOS: 2 days | Discharge: HOME | End: 2022-02-14
Attending: OBSTETRICS & GYNECOLOGY | Admitting: OBSTETRICS & GYNECOLOGY
Payer: COMMERCIAL

## 2022-02-12 ENCOUNTER — ANESTHESIA (OUTPATIENT)
Dept: LABOR AND DELIVERY | Age: 42
End: 2022-02-12
Payer: COMMERCIAL

## 2022-02-12 PROBLEM — Z34.90 ENCOUNTER FOR ELECTIVE INDUCTION OF LABOR: Status: RESOLVED | Noted: 2022-02-12 | Resolved: 2022-02-12

## 2022-02-12 PROBLEM — Z34.90 ENCOUNTER FOR ELECTIVE INDUCTION OF LABOR: Status: ACTIVE | Noted: 2022-02-12

## 2022-02-12 LAB
ABO + RH BLD: NORMAL
BASE DEFICIT BLD-SCNC: 0.6 MMOL/L
BASE EXCESS BLD CALC-SCNC: 0.2 MMOL/L
BLOOD BANK CMNT PATIENT-IMP: NORMAL
BLOOD GROUP ANTIBODIES SERPL: NORMAL
ERYTHROCYTE [DISTWIDTH] IN BLOOD BY AUTOMATED COUNT: 14.5 % (ref 11.9–14.6)
HCO3 BLD-SCNC: 24.5 MMOL/L (ref 22–26)
HCO3 BLDV-SCNC: 25.3 MMOL/L (ref 23–28)
HCT VFR BLD AUTO: 29.9 % (ref 35.8–46.3)
HGB BLD-MCNC: 9.5 G/DL (ref 11.7–15.4)
MCH RBC QN AUTO: 27.6 PG (ref 26.1–32.9)
MCHC RBC AUTO-ENTMCNC: 31.8 G/DL (ref 31.4–35)
MCV RBC AUTO: 86.9 FL (ref 79.6–97.8)
NRBC # BLD: 0 K/UL (ref 0–0.2)
PCO2 BLDCO: 41 MMHG (ref 32–68)
PCO2 BLDCO: 41 MMHG (ref 32–68)
PH BLDCO: 7.38 [PH] (ref 7.15–7.38)
PH BLDCO: 7.39 [PH] (ref 7.15–7.38)
PLATELET # BLD AUTO: 204 K/UL (ref 150–450)
PMV BLD AUTO: 10.4 FL (ref 9.4–12.3)
PO2 BLDCO: 18 MMHG
PO2 BLDCO: 20 MMHG
RBC # BLD AUTO: 3.44 M/UL (ref 4.05–5.2)
SAO2 % BLD: 26.5 % (ref 95–98)
SAO2 % BLDV: 30.2 % (ref 65–88)
SERVICE CMNT-IMP: ABNORMAL
SERVICE CMNT-IMP: ABNORMAL
SPECIMEN EXP DATE BLD: NORMAL
SPECIMEN TYPE: ABNORMAL
SPECIMEN TYPE: ABNORMAL
WBC # BLD AUTO: 9.4 K/UL (ref 4.3–11.1)

## 2022-02-12 PROCEDURE — 86900 BLOOD TYPING SEROLOGIC ABO: CPT

## 2022-02-12 PROCEDURE — 3E033VJ INTRODUCTION OF OTHER HORMONE INTO PERIPHERAL VEIN, PERCUTANEOUS APPROACH: ICD-10-PCS | Performed by: OBSTETRICS & GYNECOLOGY

## 2022-02-12 PROCEDURE — 77030014125 HC TY EPDRL BBMI -B: Performed by: NURSE ANESTHETIST, CERTIFIED REGISTERED

## 2022-02-12 PROCEDURE — 74011250636 HC RX REV CODE- 250/636: Performed by: OBSTETRICS & GYNECOLOGY

## 2022-02-12 PROCEDURE — 74011000250 HC RX REV CODE- 250: Performed by: ANESTHESIOLOGY

## 2022-02-12 PROCEDURE — 77030002888 HC SUT CHRMC J&J -A

## 2022-02-12 PROCEDURE — 75410000000 HC DELIVERY VAGINAL/SINGLE

## 2022-02-12 PROCEDURE — 10907ZC DRAINAGE OF AMNIOTIC FLUID, THERAPEUTIC FROM PRODUCTS OF CONCEPTION, VIA NATURAL OR ARTIFICIAL OPENING: ICD-10-PCS | Performed by: OBSTETRICS & GYNECOLOGY

## 2022-02-12 PROCEDURE — 65270000029 HC RM PRIVATE

## 2022-02-12 PROCEDURE — 74011250637 HC RX REV CODE- 250/637: Performed by: OBSTETRICS & GYNECOLOGY

## 2022-02-12 PROCEDURE — 00HU33Z INSERTION OF INFUSION DEVICE INTO SPINAL CANAL, PERCUTANEOUS APPROACH: ICD-10-PCS | Performed by: ANESTHESIOLOGY

## 2022-02-12 PROCEDURE — 77030018846 HC SOL IRR STRL H20 ICUM -A

## 2022-02-12 PROCEDURE — 76060000078 HC EPIDURAL ANESTHESIA

## 2022-02-12 PROCEDURE — 82803 BLOOD GASES ANY COMBINATION: CPT

## 2022-02-12 PROCEDURE — 59400 OBSTETRICAL CARE: CPT | Performed by: OBSTETRICS & GYNECOLOGY

## 2022-02-12 PROCEDURE — 74011000250 HC RX REV CODE- 250: Performed by: NURSE ANESTHETIST, CERTIFIED REGISTERED

## 2022-02-12 PROCEDURE — A4300 CATH IMPL VASC ACCESS PORTAL: HCPCS | Performed by: NURSE ANESTHETIST, CERTIFIED REGISTERED

## 2022-02-12 PROCEDURE — 85027 COMPLETE CBC AUTOMATED: CPT

## 2022-02-12 PROCEDURE — 74011250636 HC RX REV CODE- 250/636: Performed by: NURSE ANESTHETIST, CERTIFIED REGISTERED

## 2022-02-12 PROCEDURE — 0HQ9XZZ REPAIR PERINEUM SKIN, EXTERNAL APPROACH: ICD-10-PCS | Performed by: OBSTETRICS & GYNECOLOGY

## 2022-02-12 PROCEDURE — 75410000002 HC LABOR FEE PER 1 HR

## 2022-02-12 PROCEDURE — 75410000003 HC RECOV DEL/VAG/CSECN EA 0.5 HR

## 2022-02-12 RX ORDER — LIDOCAINE HYDROCHLORIDE 20 MG/ML
INJECTION, SOLUTION EPIDURAL; INFILTRATION; INTRACAUDAL; PERINEURAL AS NEEDED
Status: DISCONTINUED | OUTPATIENT
Start: 2022-02-12 | End: 2022-02-12 | Stop reason: HOSPADM

## 2022-02-12 RX ORDER — DOCUSATE SODIUM 100 MG/1
100 CAPSULE, LIQUID FILLED ORAL 2 TIMES DAILY
Status: DISCONTINUED | OUTPATIENT
Start: 2022-02-13 | End: 2022-02-14 | Stop reason: HOSPADM

## 2022-02-12 RX ORDER — DEXTROSE, SODIUM CHLORIDE, SODIUM LACTATE, POTASSIUM CHLORIDE, AND CALCIUM CHLORIDE 5; .6; .31; .03; .02 G/100ML; G/100ML; G/100ML; G/100ML; G/100ML
125 INJECTION, SOLUTION INTRAVENOUS CONTINUOUS
Status: DISCONTINUED | OUTPATIENT
Start: 2022-02-12 | End: 2022-02-12

## 2022-02-12 RX ORDER — ACETAMINOPHEN 500 MG
1000 TABLET ORAL
Status: DISCONTINUED | OUTPATIENT
Start: 2022-02-12 | End: 2022-02-14 | Stop reason: HOSPADM

## 2022-02-12 RX ORDER — ROPIVACAINE HYDROCHLORIDE 5 MG/ML
INJECTION, SOLUTION EPIDURAL; INFILTRATION; PERINEURAL AS NEEDED
Status: DISCONTINUED | OUTPATIENT
Start: 2022-02-12 | End: 2022-02-12 | Stop reason: HOSPADM

## 2022-02-12 RX ORDER — FENTANYL CITRATE 50 UG/ML
INJECTION, SOLUTION INTRAMUSCULAR; INTRAVENOUS AS NEEDED
Status: DISCONTINUED | OUTPATIENT
Start: 2022-02-12 | End: 2022-02-12 | Stop reason: HOSPADM

## 2022-02-12 RX ORDER — SODIUM CHLORIDE 0.9 % (FLUSH) 0.9 %
5-40 SYRINGE (ML) INJECTION EVERY 8 HOURS
Status: DISCONTINUED | OUTPATIENT
Start: 2022-02-12 | End: 2022-02-12

## 2022-02-12 RX ORDER — OXYTOCIN/RINGER'S LACTATE 30/500 ML
10 PLASTIC BAG, INJECTION (ML) INTRAVENOUS AS NEEDED
Status: DISCONTINUED | OUTPATIENT
Start: 2022-02-12 | End: 2022-02-13 | Stop reason: ALTCHOICE

## 2022-02-12 RX ORDER — OXYTOCIN/RINGER'S LACTATE 30/500 ML
87.3 PLASTIC BAG, INJECTION (ML) INTRAVENOUS AS NEEDED
Status: DISCONTINUED | OUTPATIENT
Start: 2022-02-12 | End: 2022-02-12

## 2022-02-12 RX ORDER — FENTANYL CITRATE 50 UG/ML
INJECTION, SOLUTION INTRAMUSCULAR; INTRAVENOUS
Status: COMPLETED
Start: 2022-02-12 | End: 2022-02-12

## 2022-02-12 RX ORDER — OXYTOCIN/RINGER'S LACTATE 30/500 ML
10 PLASTIC BAG, INJECTION (ML) INTRAVENOUS AS NEEDED
Status: DISCONTINUED | OUTPATIENT
Start: 2022-02-12 | End: 2022-02-12

## 2022-02-12 RX ORDER — IBUPROFEN 800 MG/1
800 TABLET ORAL
Status: DISCONTINUED | OUTPATIENT
Start: 2022-02-12 | End: 2022-02-14 | Stop reason: HOSPADM

## 2022-02-12 RX ORDER — SIMETHICONE 80 MG
80 TABLET,CHEWABLE ORAL
Status: DISCONTINUED | OUTPATIENT
Start: 2022-02-12 | End: 2022-02-14 | Stop reason: HOSPADM

## 2022-02-12 RX ORDER — SODIUM CHLORIDE 0.9 % (FLUSH) 0.9 %
5-40 SYRINGE (ML) INJECTION AS NEEDED
Status: DISCONTINUED | OUTPATIENT
Start: 2022-02-12 | End: 2022-02-12

## 2022-02-12 RX ORDER — LIDOCAINE HYDROCHLORIDE 10 MG/ML
1 INJECTION INFILTRATION; PERINEURAL
Status: DISCONTINUED | OUTPATIENT
Start: 2022-02-12 | End: 2022-02-12 | Stop reason: HOSPADM

## 2022-02-12 RX ORDER — ROPIVACAINE HYDROCHLORIDE 2 MG/ML
INJECTION, SOLUTION EPIDURAL; INFILTRATION; PERINEURAL AS NEEDED
Status: DISCONTINUED | OUTPATIENT
Start: 2022-02-12 | End: 2022-02-12 | Stop reason: HOSPADM

## 2022-02-12 RX ORDER — ROPIVACAINE HYDROCHLORIDE 2 MG/ML
INJECTION, SOLUTION EPIDURAL; INFILTRATION; PERINEURAL
Status: DISCONTINUED | OUTPATIENT
Start: 2022-02-12 | End: 2022-02-12 | Stop reason: HOSPADM

## 2022-02-12 RX ORDER — ZOLPIDEM TARTRATE 5 MG/1
5 TABLET ORAL
Status: DISCONTINUED | OUTPATIENT
Start: 2022-02-12 | End: 2022-02-14 | Stop reason: HOSPADM

## 2022-02-12 RX ORDER — ONDANSETRON 4 MG/1
4 TABLET, ORALLY DISINTEGRATING ORAL
Status: DISCONTINUED | OUTPATIENT
Start: 2022-02-12 | End: 2022-02-14 | Stop reason: HOSPADM

## 2022-02-12 RX ORDER — OXYCODONE HYDROCHLORIDE 5 MG/1
5 TABLET ORAL
Status: DISCONTINUED | OUTPATIENT
Start: 2022-02-12 | End: 2022-02-14 | Stop reason: HOSPADM

## 2022-02-12 RX ORDER — LOPERAMIDE HYDROCHLORIDE 2 MG/1
2 CAPSULE ORAL AS NEEDED
Status: DISCONTINUED | OUTPATIENT
Start: 2022-02-12 | End: 2022-02-14 | Stop reason: HOSPADM

## 2022-02-12 RX ORDER — OXYTOCIN/RINGER'S LACTATE 30/500 ML
87.3 PLASTIC BAG, INJECTION (ML) INTRAVENOUS AS NEEDED
Status: DISCONTINUED | OUTPATIENT
Start: 2022-02-12 | End: 2022-02-13 | Stop reason: ALTCHOICE

## 2022-02-12 RX ORDER — OXYCODONE HYDROCHLORIDE 5 MG/1
10 TABLET ORAL
Status: DISCONTINUED | OUTPATIENT
Start: 2022-02-12 | End: 2022-02-14 | Stop reason: HOSPADM

## 2022-02-12 RX ORDER — OXYTOCIN/RINGER'S LACTATE 30/500 ML
0-20 PLASTIC BAG, INJECTION (ML) INTRAVENOUS
Status: DISCONTINUED | OUTPATIENT
Start: 2022-02-12 | End: 2022-02-12

## 2022-02-12 RX ORDER — BUTORPHANOL TARTRATE 2 MG/ML
1 INJECTION INTRAMUSCULAR; INTRAVENOUS
Status: DISCONTINUED | OUTPATIENT
Start: 2022-02-12 | End: 2022-02-12 | Stop reason: HOSPADM

## 2022-02-12 RX ORDER — DIPHENHYDRAMINE HCL 25 MG
25 CAPSULE ORAL
Status: DISCONTINUED | OUTPATIENT
Start: 2022-02-12 | End: 2022-02-14 | Stop reason: HOSPADM

## 2022-02-12 RX ORDER — LIDOCAINE HYDROCHLORIDE 20 MG/ML
JELLY TOPICAL
Status: DISCONTINUED | OUTPATIENT
Start: 2022-02-12 | End: 2022-02-12 | Stop reason: HOSPADM

## 2022-02-12 RX ORDER — MINERAL OIL
120 OIL (ML) ORAL
Status: COMPLETED | OUTPATIENT
Start: 2022-02-12 | End: 2022-02-12

## 2022-02-12 RX ORDER — LIDOCAINE HYDROCHLORIDE AND EPINEPHRINE 15; 5 MG/ML; UG/ML
INJECTION, SOLUTION EPIDURAL
Status: COMPLETED | OUTPATIENT
Start: 2022-02-12 | End: 2022-02-12

## 2022-02-12 RX ADMIN — ROPIVACAINE HYDROCHLORIDE 3 ML: 5 INJECTION, SOLUTION EPIDURAL; INFILTRATION; PERINEURAL at 15:20

## 2022-02-12 RX ADMIN — LIDOCAINE HYDROCHLORIDE 2 ML: 20 INJECTION, SOLUTION EPIDURAL; INFILTRATION; INTRACAUDAL; PERINEURAL at 18:15

## 2022-02-12 RX ADMIN — ROPIVACAINE HYDROCHLORIDE 5 ML: 2 INJECTION, SOLUTION EPIDURAL; INFILTRATION at 15:24

## 2022-02-12 RX ADMIN — SODIUM CHLORIDE, SODIUM LACTATE, POTASSIUM CHLORIDE, CALCIUM CHLORIDE, AND DEXTROSE MONOHYDRATE 125 ML/HR: 600; 310; 30; 20; 5 INJECTION, SOLUTION INTRAVENOUS at 09:36

## 2022-02-12 RX ADMIN — SODIUM CHLORIDE, SODIUM LACTATE, POTASSIUM CHLORIDE, CALCIUM CHLORIDE, AND DEXTROSE MONOHYDRATE 125 ML/HR: 600; 310; 30; 20; 5 INJECTION, SOLUTION INTRAVENOUS at 17:30

## 2022-02-12 RX ADMIN — MINERAL OIL 120 ML: 1000 LIQUID ORAL at 18:41

## 2022-02-12 RX ADMIN — Medication 1 MILLI-UNITS/MIN: at 10:30

## 2022-02-12 RX ADMIN — LIDOCAINE HYDROCHLORIDE,EPINEPHRINE BITARTRATE 5 ML: 15; .005 INJECTION, SOLUTION EPIDURAL; INFILTRATION; INTRACAUDAL; PERINEURAL at 15:11

## 2022-02-12 RX ADMIN — FENTANYL CITRATE 100 MCG: 50 INJECTION INTRAMUSCULAR; INTRAVENOUS at 18:15

## 2022-02-12 RX ADMIN — ROPIVACAINE HYDROCHLORIDE 8 ML/HR: 2 INJECTION, SOLUTION EPIDURAL; INFILTRATION; PERINEURAL at 15:24

## 2022-02-12 RX ADMIN — ROPIVACAINE HYDROCHLORIDE 2 ML: 5 INJECTION, SOLUTION EPIDURAL; INFILTRATION; PERINEURAL at 15:22

## 2022-02-12 NOTE — ANESTHESIA PROCEDURE NOTES
Epidural Block    Patient location during procedure: OB  Start time: 2/12/2022 3:11 PM  End time: 2/12/2022 3:20 PM  Reason for block: labor epidural  Staffing  Performed: attending   Anesthesiologist: Radames Ann MD  Preanesthetic Checklist  Completed: patient identified, risks and benefits discussed, surgical consent, pre-op evaluation and timeout performed  Block Placement  Patient position: sitting  Prep: ChloraPrep  Sterility prep: cap, drape, gloves, hand and mask  Sedation level: no sedation  Patient monitoring: continuous pulse oximetry and heart rate  Approach: midline  Location: lumbar  Lumbar location: L3-L4  Epidural  Loss of resistance technique: air  Guidance: landmark technique  Needle  Needle type: Tuohy   Needle gauge: 17 G  Needle length: 9 cm  Needle insertion depth: 6 cm  Catheter type: end hole  Catheter size: 19 G  Catheter at skin depth: 11 cm  Catheter securement method: clear occlusive dressing, liquid medical adhesive and surgical tape  Test dose: negative (@ 1517)  Medications Administered  Lidocaine-EPINEPHrine (XYLOCAINE) 1.5 %-1:200,000 Epidural, 5 mL  Assessment  Number of attempts: 1  Procedure assessment: patient tolerated procedure well with no immediate complications

## 2022-02-12 NOTE — L&D DELIVERY NOTE
Delivery Summary    Patient: Hue Norman MRN: [id number]  SSN: [id number]    YOB: 1980  Age: 37 y.o. Sex: female       Information for the patient's :  Karolina Castano [[id number]]       Labor Events:    Labor: No    Steroids: None   Cervical Ripening Date/Time:       Cervical Ripening Type: None   Antibiotics During Labor: No   Rupture Identifier:      Rupture Date/Time: 2022 4:05 PM   Rupture Type: AROM   Amniotic Fluid Volume: Moderate    Amniotic Fluid Description: Clear    Amniotic Fluid Odor: None    Induction: Oxytocin;AROM       Induction Date/Time: 2022 10:30 AM    Indications for Induction: Mild Preeclampsia    Augmentation: AROM   Augmentation Date/Time: 24:05 PM   Indications for Augmentation:     Labor complications: None       Additional complications:        Delivery Events:  Indications For Episiotomy:     Episiotomy: None   Perineal Laceration(s): 1st   Repaired: Yes   Periurethral Laceration Location: right    Repaired: No    Labial Laceration Location:     Repaired:     Sulcal Laceration Location:     Repaired:     Vaginal Laceration Location:     Repaired:     Cervical Laceration Location:     Repaired:     Repair Suture: Chromic 2-0   Number of Repair Packets: 1   Estimated Blood Loss (ml):  ml   Quantitative Blood Loss (ml)                Delivery Date: 2022    Delivery Time: 6:29 PM  Delivery Type: Vaginal, Spontaneous  Sex:  Female    Gestational Age: 37w0d   Delivery Clinician:  Mariel Marks  Living Status: Living   Delivery Location: L&D 430          APGARS  One minute Five minutes Ten minutes   Skin color: 1   1        Heart rate: 2   2        Grimace: 2   2        Muscle tone: 2   2        Breathin   2        Totals: 9   9            Presentation: Vertex    Position: Left Occiput Anterior  Resuscitation Method:  Suctioning-bulb; Tactile Stimulation     Meconium Stained: None      Cord Information: 3 Vessels  Complications: Nuchal Cord Without Compressions  Cord around: head  Delayed cord clamping? Yes  Cord clamped date/time:2022  6:30 PM  Disposition of Cord Blood: Lab    Blood Gases Sent?: Yes    Placenta:  Date/Time: 2022  6:34 PM  Removal: Spontaneous      Appearance: Normal;Intact     [place] Measurements:  Birth Weight: 6 lb 8.8 oz (2.97 kg)      Birth Length: 50.8 cm      Head Circumference: 33.5 cm      Chest Circumference: 32 cm     Abdominal Girth: Other Providers:   Cam HERNANDEZ;WALDEMAR MARTINEZ;LENA ALEXANDER;ISAAK LR;CINTHYA GARCIA, Obstetrician;Primary Nurse;Primary  Nurse;Scrub Tech;Charge Nurse           Group B Strep:   Lab Results   Component Value Date/Time    GrHERIBERTOtreimmanuel, External negative 2017 12:00 AM     Information for the patient's :  Raad Sandoval [[id number]]   No results found for: ABORH, PCTABR, PCTDIG, BILI, ABORHEXT, ABORH     No results for input(s): PCO2CB, PO2CB, HCO3I, SO2I, IBD, PTEMPI, SPECTI, PHICB, ISITE, IDEV, IALLEN in the last 72 hours.

## 2022-02-12 NOTE — H&P
History & Physical    Name: Reynold Claude MRN: [id number]  SSN: [id number]    YOB: 1980  Age: 37 y.o. Sex: female      Subjective:     Estimated Date of Delivery: 3/5/22  OB History    Para Term  AB Living   2 1 1     1   SAB IAB Ectopic Molar Multiple Live Births           0 1      # Outcome Date GA Lbr Avni/2nd Weight Sex Delivery Anes PTL Lv   2 Current            1 Term 17 39w5d 03::48 7 lb 14 oz (3.572 kg) F Vag-Spont EPIDURAL AN N GIANFRANCO       Ms. Tamala Gowers is admitted with pregnancy at 37w0d for induction of labor due to preeclampsia. Prenatal course was complicated by preeclampsia. Please see prenatal records for details. Past Medical History:   Diagnosis Date    Gestational hypertension 2022    Iron deficiency anemia 2017    Nausea/vomiting in pregnancy 2021    - till 26wks - zofran helps, needs daily or every other day     Preeclampsia      Past Surgical History:   Procedure Laterality Date    HX WISDOM TEETH EXTRACTION       Social History     Occupational History    Not on file   Tobacco Use    Smoking status: Never Smoker    Smokeless tobacco: Never Used   Substance and Sexual Activity    Alcohol use: No    Drug use: No    Sexual activity: Yes     Partners: Male     Birth control/protection: None     Family History   Problem Relation Age of Onset    Ovarian Cancer Mother 43    Cancer Mother         uterine    Colon Cancer Neg Hx     Breast Cancer Neg Hx        Allergies   Allergen Reactions    Augmentin [Amoxicillin-Pot Clavulanate] Hives     Prior to Admission medications    Medication Sig Start Date End Date Taking? Authorizing Provider   magnesium 250 mg tab Take  by mouth. Yes Provider, Historical   ondansetron (Zofran ODT) 4 mg disintegrating tablet Take 1 Tablet by mouth every six (6) hours as needed for Nausea.  Indications: excessive vomiting in pregnancy 21  Yes Ck Chowdhury MD   calcium carbonate (TUMS) 200 mg calcium (500 mg) chew Take 1 Tablet by mouth daily. Yes Provider, Historical   prenatal 168/iron/folic/omega3 (ONE-A-DAY PRENATAL-1 PO) Take  by mouth. Yes Provider, Historical   doxylamine succinate (UNISOM) 25 mg tablet Take 25 mg by mouth nightly as needed. Yes Provider, Historical   pyridoxine, vitamin B6, (Vitamin B-6) 25 mg tablet Take 25 mg by mouth daily. Yes Provider, Historical        Review of Systems: A comprehensive review of systems was negative except for that written in the History of Present Illness. Objective:     Vitals:  Vitals:    02/12/22 1336 02/12/22 1406 02/12/22 1436 02/12/22 1506   BP: 128/83 132/86 129/80 (!) 145/85   Pulse: 78 79 78 81   Resp:    16   Temp:    98.5 °F (36.9 °C)   Weight:       Height:            Physical Exam:  Patient without distress. Heart: Regular rate and rhythm  Lung: clear to auscultation throughout lung fields, no wheezes, no rales, no rhonchi and normal respiratory effort  Cervical Exam: 6/70 %/-2/   Membranes:  Intact  Fetal Heart Rate: Reactive          Prenatal Labs:   Lab Results   Component Value Date/Time    ABO/Rh(D) O POSITIVE 02/12/2022 09:13 AM    Rubella, External immune 12/06/2016 12:00 AM    HBsAg, External negative 12/06/2016 12:00 AM    HIV, External negative 12/06/2016 12:00 AM    RPR, External non reactive 12/06/2016 12:00 AM    Gonorrhea, External Negative 11/29/2016 12:00 AM    Chlamydia, External Negative 11/29/2016 12:00 AM    ABO,Rh O positive 12/06/2016 12:00 AM    GrBStrep, External negative 06/22/2017 12:00 AM       Impression/Plan:     Principal Problem:    Preeclampsia (1/24/2022)      Overview: 1/24/2022 at [facility]: Blood pressure elevated but stable today 139/95. Has       Proteinuria confirmed PreE without Severe features. No symptoms today,       normal exam. Following up at [facility] for twice weekly testing, next appointment       2/4/2022. PreE Symptoms reviewed, told to go to Triage. · No follow up [facility].       · Twice weekly fetal testing in OB office scheduled. · Deliver at 37 weeks, earlier for Severe Features. Active Problems:    Multigravida of advanced maternal age in third trimester (7/29/2021)      Overview: - 37 @ [facility].         - NIPT / Carrier 8/20/2021 11w6d         - NIPT = low risk, Female fetus        - Patient is positive for being an SMA carrier; FOB vytfas01/2021 and       was positive for SMA carrier. 10/11/2021 at [facility]: Normal anatomy/echo; AC 75%, JACQUELINE WNL. Low risk NIPT. Patient reassured on anatomy, low risk of SMA.      - h/o anemia start Fe when nausea improves. 12/15/2021 at [facility]: Appropriate fetal growth and normal repeat anatomy and       echo; AC 56%, Overall 63%, Dopplers WNL, BPP 8/8 JACQUELINE 17cm. 1/24/2022 at [facility]: Normal growth and echo; AC 40%, Overall 44%, BPP 8/8,       JACQUELINE 16.9cm (DVP 5.1)      2/1/2022 at [facility]: Normal growth and echo; JACQUELINE: 14cm (DVP 4.7) BPP 8/8      · Start twice weekly fetal testing at [facility], no follow up [facility]      See Preeclampsia in Pregnancy Overview            COVID-19 affecting pregnancy in third trimester (1/24/2022)      Overview: 1/8/22 She c/o a itchy throat and some cold like symptoms. She stated       that her partner was positive for COVID from earlier in the week      1/24/2022 at [facility]: Sx Day 18, Covid + 1/11/2022. No residual symptoms of       Covid today. Carrier of spinal muscular atrophy (10/11/2021)      Overview: 10/11/2021 at [facility]: Pt and  met with genetic counselor today.  is going to get tested to see if he is a SMA carrier. Pt will       contact Marium Marshall, genetic counselor to get this set up.             12/15/2021 at [facility]: Called to obtain Husbands SMA results. Patient seen       prior to FOB results and told normal anatomy and low risk of fetus having       SMA. *FOB report then received after patient left. He is a + SMA carrier for       copy SMN1.   Reviewed results with [address] Counselor. Fetus now 25% risk of       having SMA. Patient called for her and  to come in to discuss       results (Results Not Given over the Phone). Patient and  coming in       Friday to discuss. 12/17/2021 at [facility]: Patient and  here to discuss both being SMA       Carriers. Explained again the 25% risk of baby having SMA and treatment       options now available after delivery. Questions answered. They have a       very positive attitude. REsources and information given. · Plan sending cord blood at delivery for SMA. · I will set up. Plan: Admit for induction of labor. Group B Strep negative. To get epidural then AROM.

## 2022-02-12 NOTE — PROGRESS NOTES
Chelsea Galindo at bedside at [phone]. MAYRA Aguilar at bedside at [phone]. Assisted pt to sitting up on bedside at 1505. Timeout completed at 12 with MD, MAYRA and myself at bedside. Test dose given at 1517. Negative reaction. Dose given at 1522. Pt assisted to lying back in left tilt position. See anesthesia record for details. See vital sign flow sheet for BP. Tolerated procedure well.

## 2022-02-12 NOTE — ANESTHESIA PREPROCEDURE EVALUATION
Relevant Problems   CARDIOVASCULAR   (+) Preeclampsia       Anesthetic History     PONV          Review of Systems / Medical History  Patient summary reviewed, nursing notes reviewed and pertinent labs reviewed    Pulmonary  Within defined limits                 Neuro/Psych   Within defined limits           Cardiovascular    Hypertension (gestational)              Exercise tolerance: >4 METS     GI/Hepatic/Renal  Within defined limits              Endo/Other  Within defined limits           Other Findings              Physical Exam    Airway  Mallampati: II      Mouth opening: Normal     Cardiovascular  Regular rate and rhythm,  S1 and S2 normal,  no murmur, click, rub, or gallop             Dental  No notable dental hx       Pulmonary  Breath sounds clear to auscultation               Abdominal         Other Findings            Anesthetic Plan    ASA: 2  Anesthesia type: epidural            Anesthetic plan and risks discussed with: Patient and Spouse

## 2022-02-13 PROCEDURE — 74011250637 HC RX REV CODE- 250/637: Performed by: OBSTETRICS & GYNECOLOGY

## 2022-02-13 PROCEDURE — 65270000029 HC RM PRIVATE

## 2022-02-13 RX ORDER — HYDROCHLOROTHIAZIDE 25 MG/1
25 TABLET ORAL DAILY
Status: DISCONTINUED | OUTPATIENT
Start: 2022-02-13 | End: 2022-02-14 | Stop reason: HOSPADM

## 2022-02-13 RX ADMIN — IBUPROFEN 800 MG: 800 TABLET, FILM COATED ORAL at 23:39

## 2022-02-13 RX ADMIN — HYDROCHLOROTHIAZIDE 25 MG: 25 TABLET ORAL at 10:29

## 2022-02-13 RX ADMIN — DOCUSATE SODIUM 100 MG: 100 CAPSULE ORAL at 10:29

## 2022-02-13 RX ADMIN — DOCUSATE SODIUM 100 MG: 100 CAPSULE ORAL at 18:11

## 2022-02-13 RX ADMIN — IBUPROFEN 800 MG: 800 TABLET, FILM COATED ORAL at 11:55

## 2022-02-13 RX ADMIN — IBUPROFEN 800 MG: 800 TABLET, FILM COATED ORAL at 06:01

## 2022-02-13 RX ADMIN — IBUPROFEN 800 MG: 800 TABLET, FILM COATED ORAL at 00:06

## 2022-02-13 RX ADMIN — IBUPROFEN 800 MG: 800 TABLET, FILM COATED ORAL at 18:11

## 2022-02-13 RX ADMIN — WITCH HAZEL 1 PAD: 500 SOLUTION RECTAL; TOPICAL at 00:06

## 2022-02-13 NOTE — PROGRESS NOTES
Progress Note                               Patient: Virginia Hernandez MRN: [id number]  SSN: [id number]    YOB: 1980  Age: 37 y.o. Sex: female      Postpartum Day Number 1    Subjective:     Patient doing well postpartum without significant complaints. Voiding without difficulty. Patient reports normal lochia. . Breastfeeding: Yes     Objective:     Patient Vitals for the past 18 hrs:   Temp Pulse Resp BP SpO2   22 0830 98.6 °F (37 °C) 81 16 133/89 97 %   22 0330 98 °F (36.7 °C) 77 18 129/84 97 %   22 2300 98.2 °F (36.8 °C) (!) 102 18 113/83 96 %   22 1900 98.7 °F (37.1 °C)       22 1852  98  (!) 159/65    22 1835  87  132/85    22 1820  93  132/87    22 1805  90  121/79    22 1750  88  122/83    22 1735 98.6 °F (37 °C) 91  126/81    22 1721  86  124/75    22 1705  86  113/72    22 1650  78  118/70    22 1635  87  112/70    22 1619  92  109/70    22 1615  81  109/69    22 1610  93  110/70    22 1604  93  99/62    22 1559  90  (!) 93/51    22 1555  99  102/64    22 1550  93  111/69    22 1545  94  112/68    22 1540  (!) 101  123/71    22 1534  99  131/73         Temp (24hrs), Av.4 °F (36.9 °C), Min:98 °F (36.7 °C), Max:98.7 °F (37.1 °C)      Intake/Output Summary (Last 24 hours) at 2022 0930  Last data filed at 2022 0601  Gross per 24 hour   Intake 650 ml   Output 3166 ml   Net -2516 ml      Physical Exam:    Patient without distress. Lower Extremities:  - Edema No   - No evidence of DVT seen on physical exam.    Lab/Data Review: All lab results for the last 24 hours reviewed.   GBS:   Lab Results   Component Value Date/Time    GrBStrep, External negative 2017 12:00 AM     Blood Type:   Lab Results   Component Value Date/Time    ABO/Rh(D) O POSITIVE 2022 09:13 AM    ABO,Rh Gerald Briones positive 12/06/2016 12:00 AM        Assessment and Plan:     Patient appears to be having an uncomplicated postpartum course. Continue routine perineal care and maternal education. , good diuresis, nonetheless will start HCTZ (instead of lasix d/t good diuresis) x 5 days. Watch BP for increases.

## 2022-02-13 NOTE — PROGRESS NOTES
Admission assessment complete as noted. Patient oriented to room and unit. Plan of care reviewed and patient verbalizes understanding. Questions encouraged and answered. Patent encouraged to call for needs or concerns. Safety Teaching reviewed:   1. Hand hygiene prior to handling the infant. 2. Use of bulb syringe. 3. Bracelets with matching numbers are placed on mother and infant  4. An infant security tag  [facility]) is placed on the infant's ankle and monitored  5. All OB nurses wear pink Employee badges - do not give your baby to anyone without proper identification. 6. Never leave the baby alone in the room. 7. The infant should be placed on their back to sleep. on a firm mattress. No toys should be placed in the crib. (safe sleep video offered to view)  8. Never shake the baby (video offered to view)  9. Infant fall prevention - do not sleep with the baby, and place the baby in the crib while ambulating. 8. Mother and Baby Care booklet given to Mother.

## 2022-02-13 NOTE — PROGRESS NOTES
SBAR OUT Report: Mother    Verbal report given to Berto Gaines (full name & credentials) on this patient, who is now being transferred to MIU (unit) for routine progression of care. The patient is not wearing a green \"Anesthesia-Duramorph\" band. Report consisted of patient's Situation, Background, Assessment and Recommendations (SBAR). [place] ID bands were compared with the identification form, and verified with the patient and receiving nurse. Information from the SBAR, Intake/Output and MAR and the [place] Report was reviewed with the receiving nurse; opportunity for questions and clarification provided. Fundal check performed with oncoming RN.

## 2022-02-13 NOTE — LACTATION NOTE

## 2022-02-13 NOTE — PROGRESS NOTES
Bedside Report of care using Wow received from [facility], RN. Jackson zhong.

## 2022-02-13 NOTE — LACTATION NOTE
This note was copied from a baby's chart. Mom called out for latch observation. Baby had fed about 10 minutes 1 hour prior per her report. Drowsy but roused easily once unswaddled. Assisted on left breast/ football hold. Mom has good technique. Everted nipples. Baby latched. Did stay on but suck a little choppy. Would suck for a few minuets but then come off and need to be relatched. Overall did fair but sleepy too. Did slightly better after diaper change but still sleepy. Will watch feeds closely, not yet 24 hours old but also 37 week gestation. Did feed more actively earlier per mom. Keep feeding on demand. At least every  3 hours, wake as needed.

## 2022-02-13 NOTE — LACTATION NOTE
This note was copied from a baby's chart. Lactation visit. 2nd time mom, experienced breastfeeder. This baby 37 weeks, not yet 24 hours old. Has been latching fairly well per mom report. Sleepy now. Reviewed feeding expectations in first 24 hours of life. Watch for feeding cues. Feed at least every 3 hours since baby 37 week gestation. Reviewed waking measures. Offered assistance with feeding as needed.

## 2022-02-13 NOTE — PROGRESS NOTES
Pt ambulated to restroom with one person assist. Pt tolerated well. Epidural removed. New pad and mesh panties placed on patient. Ambulated to wheelchair with minimal assistance. No complaints.

## 2022-02-13 NOTE — PROGRESS NOTES
SBAR IN Report: Mother    Verbal report received from Darion Harris RN on this patient, who is now being transferred from labor and delivery for routine progression of care. The patient is not wearing a green \"Anesthesia-Duramorph\" band. Report consisted of patient's Situation, Background, Assessment and Recommendations (SBAR). [place] ID bands were compared with the identification form, and verified with the patient and transferring nurse. Information from the SBAR, Kardex, Procedure Summary, Intake/Output, MAR and Recent Results and the [place]herrin Report was reviewed with the transferring nurse; opportunity for questions and clarification provided.

## 2022-02-14 VITALS
HEART RATE: 86 BPM | WEIGHT: 172 LBS | BODY MASS INDEX: 29.37 KG/M2 | SYSTOLIC BLOOD PRESSURE: 120 MMHG | DIASTOLIC BLOOD PRESSURE: 82 MMHG | OXYGEN SATURATION: 98 % | RESPIRATION RATE: 18 BRPM | HEIGHT: 64 IN | TEMPERATURE: 97.8 F

## 2022-02-14 PROCEDURE — 74011250637 HC RX REV CODE- 250/637: Performed by: OBSTETRICS & GYNECOLOGY

## 2022-02-14 RX ORDER — IBUPROFEN 800 MG/1
800 TABLET ORAL
Qty: 60 TABLET | Refills: 2 | Status: SHIPPED | OUTPATIENT
Start: 2022-02-14 | End: 2022-03-01

## 2022-02-14 RX ORDER — OXYCODONE HYDROCHLORIDE 5 MG/1
5 TABLET ORAL
Qty: 12 TABLET | Refills: 0 | Status: SHIPPED | OUTPATIENT
Start: 2022-02-14 | End: 2022-02-19

## 2022-02-14 RX ORDER — HYDROCHLOROTHIAZIDE 25 MG/1
25 TABLET ORAL DAILY
Qty: 4 TABLET | Refills: 0 | Status: SHIPPED | OUTPATIENT
Start: 2022-02-15 | End: 2022-02-22

## 2022-02-14 RX ADMIN — HYDROCHLOROTHIAZIDE 25 MG: 25 TABLET ORAL at 08:27

## 2022-02-14 RX ADMIN — DOCUSATE SODIUM 100 MG: 100 CAPSULE ORAL at 08:27

## 2022-02-14 RX ADMIN — IBUPROFEN 800 MG: 800 TABLET, FILM COATED ORAL at 10:51

## 2022-02-14 RX ADMIN — IBUPROFEN 800 MG: 800 TABLET, FILM COATED ORAL at 05:19

## 2022-02-14 NOTE — PROGRESS NOTES
Chart reviewed - no needs identified. SW met with patient/ while social distancing w/appropriate PPE. PCP is [facility] (chart updated). Patient states that she experienced anxiety after the birth of her first daughter (). This anxiety lasted for approximately 8 months. She didn't seek therapy/medication. Per patient, \"I talked about it\" with friends and her . Patient reports that there are many different factors after this delivery. For example, they have a nanny and her  is able to take up to 12 weeks off from work. Additionally, patient is aware of signs/symptoms of postpartum anxiety. Patient given informational packet on  mood & anxiety disorders (resources/education). Family denies any additional needs from  at this time. Family has 's contact information should any needs/questions arise.     VAMSHI Castañeda, [address] [facility]   [phone]

## 2022-02-14 NOTE — PROGRESS NOTES
Post-Partum Day Number 2 Progress/Discharge Note    Patient doing well post-partum without significant complaint. Voiding without difficulty, normal lochia. Vitals:  Patient Vitals for the past 8 hrs:   BP Temp Pulse Resp SpO2   22 1051 120/82 97.8 °F (36.6 °C) 86 18 98 %   22 0749 (!) 142/86 98 °F (36.7 °C) 87 18 99 %     Temp (24hrs), Av.2 °F (36.8 °C), Min:97.8 °F (36.6 °C), Max:98.8 °F (37.1 °C)      Vital signs stable, afebrile. Exam:  Patient without distress. Abdomen soft, fundus firm at level of umbilicus, non tender               Lower extremities are negative for swelling, cords or tenderness. Lab/Data Review: All lab results for the last 24 hours reviewed. Assessment and Plan:  Patient appears to be having uncomplicated post-partum course. Continue routine perineal care and maternal education. Plan discharge for today with follow up in our office in 1-2 weeks with Dr. Nayla Blanco.

## 2022-02-14 NOTE — LACTATION NOTE
This note was copied from a baby's chart. Noted planning for discharge today. Mom reports feedings going well. Observed in football on R. Encouraged frequent feeding. Watch output. Call as needed. Mom getting sore, encouraged changing position, lip flange, lanolin or olive/coconut oil. Suggested get baby started sucking on finger to help get in a good rhythm. Mom can hand express before feeding to get milk started and pull out nipple. Colostrum is thick and there is not much volume, so baby can put a lot of pressure on the nipple before swallowing. Once milk volume is in and flowing well, pressure should decrease. Encouraged mom to report any nipple damage or bleeding beyond soreness.

## 2022-02-14 NOTE — DISCHARGE INSTRUCTIONS
Vaginal Childbirth: Care Instructions  Overview     Vaginal birth means delivering a baby through the birth canal (vagina). During labor, the uterus tightens (contracts) regularly to thin and open the cervix and to push the baby out through the birth canal.  Your body will slowly heal in the next few weeks. It's easy to get too tired and overwhelmed during the first weeks after your baby is born. Changes in your hormones can shift your mood without warning. You may find it hard to meet the extra demands on your energy and time. Take it easy on yourself. Follow-up care is a key part of your treatment and safety. Be sure to make and go to all appointments, and call your doctor if you are having problems. It's also a good idea to know your test results and keep a list of the medicines you take. How can you care for yourself at home? Vaginal bleeding and cramps  · After delivery, you will have a bloody discharge from your vagina. This will turn pink within a week and then white or yellow after about 10 days. It may last for 2 to 4 weeks or longer, until the uterus has healed. Use sanitary pads until you stop bleeding. Using pads makes it easier to monitor your bleeding. · Don't worry if you pass some blood clots, as long as they are smaller than a golf ball. If you have a tear or stitches in your vaginal area, change the pad at least every 4 hours. This will help prevent soreness and infection. · You may have cramps for the first few days after childbirth. These are normal and occur as the uterus shrinks to normal size. Take an over-the-counter pain medicine, such as acetaminophen (Tylenol), ibuprofen (Advil, Motrin), or naproxen (Aleve), for cramps. Read and follow all instructions on the label. Do not take aspirin, because it can cause more bleeding. · Do not take two or more pain medicines at the same time unless the doctor told you to. Many pain medicines have acetaminophen, which is Tylenol.  Too much acetaminophen (Tylenol) can be harmful. Stitches  · If you have stitches, they will dissolve on their own and don't need to be removed. Follow your doctor's instructions for cleaning the stitched area. · Put ice or a cold pack on your painful area for 10 to 20 minutes at a time, several times a day, for the first few days. Put a thin cloth between the ice and your skin. · Sit in a few inches of warm water (sitz bath) 3 times a day and after bowel movements. The warm water helps with pain and itching. If you don't have a tub, a warm shower might help. Breast fullness  · Your breasts may overfill (engorge) in the first few days after delivery. To help milk flow and to relieve pain, warm your breasts in the shower or by using warm, moist towels before nursing. · If you aren't nursing, don't put warmth on your breasts or touch your breasts. Wear a bra that fits well and use ice until the fullness goes away. This usually takes 2 to 3 days. · Put ice or a cold pack on your breast after nursing to reduce swelling and pain. Put a thin cloth between the ice and your skin. Activity  · Eat a balanced diet. Don't try to lose weight by cutting calories. Keep taking your prenatal vitamins, or take a multivitamin. · Get as much rest as you can. Try to take naps when your baby sleeps during the day. · Get some exercise every day. But don't do any heavy exercise until your doctor says it is okay. · Wait until you are healed (about 4 to 6 weeks) before you have sexual intercourse. Your doctor will tell you when it is okay to have sex. · If you don't want to get pregnant, talk to your doctor about birth control. You can get pregnant even before your period returns. Also, you can get pregnant while you are breastfeeding. Mental health  · It's normal to have some sadness, anxiety, sleeplessness, and mood swings after you go home.  If you feel upset or hopeless for more than a few days or are having trouble doing the things you need to do, talk to your doctor. Constipation and hemorrhoids  · Drink plenty of fluids. If you have kidney, heart, or liver disease and have to limit fluids, talk with your doctor before you increase the amount of fluids you drink. · Eat plenty of fiber each day. Have a bran muffin or bran cereal for breakfast. Try eating a piece of fruit for a mid-afternoon snack. · For painful, itchy hemorrhoids, put ice or a cold pack on the area several times a day for 10 minutes at a time. Follow this by putting a warm compress on the area for another 10 to 20 minutes or by sitting in a shallow, warm bath. When should you call for help? Call 911  anytime you think you may need emergency care. For example, call if:    · You have thoughts of harming yourself, your baby, or another person.     · You passed out (lost consciousness).     · You have chest pain, are short of breath, or cough up blood.     · You have a seizure. Call your doctor now or seek immediate medical care if:    · You have severe vaginal bleeding.     · You are dizzy or lightheaded, or you feel like you may faint.     · You have a fever.     · You have new or more pain in your belly or pelvis.     · You have symptoms of a blood clot in your leg (called a deep vein thrombosis), such as:  ? Pain in the calf, back of the knee, thigh, or groin. ? Redness and swelling in your leg or groin.     · You have signs of preeclampsia, such as:  ? Sudden swelling of your face, hands, or feet. ? New vision problems (such as dimness, blurring, or seeing spots). ? A severe headache. Watch closely for changes in your health, and be sure to contact your doctor if:    · Your vaginal bleeding seems to be getting heavier.     · You have new or worse vaginal discharge.     · You feel sad, anxious, or hopeless for more than a few days.     · You do not get better as expected. Where can you learn more?   Go to http://www.gray.com/  Enter P731 in the search box to learn more about \"Vaginal Childbirth: Care Instructions. \"  Current as of: June 16, 2021               Content Version: 13.0  © 2006-2021 [employer]. Care instructions adapted under license by [employer] (which disclaims liability or warranty for this information). If you have questions about a medical condition or this instruction, always ask your healthcare professional. [place] [address] any warranty or liability for your use of this information.

## 2022-02-14 NOTE — LACTATION NOTE
This note was copied from a baby's chart. Mom reports feedings going well. No problems or questions. Encouraged frequent feeding. Watch output. Call as needed. Lower milk supply initially with first.  Discussed early insurance pumping once home. Encouraged to double pump after most daytime feedings for 10 minutes to help milk come in.  Give all colostrum in a straight or curved tip syringe.

## 2022-02-15 NOTE — ADT AUTH CERT NOTES
[facility]     FACILITY NPI :[id number]  FACILITY TAX ID :      [facility]  SFE 4 MOTHER INFANT  [address] [facility] [address] [phone]          DEPT CONTACT:  Alexandro Freedman  FN#[phone]  Q#[phone]       Patient Name :Coco Nguyen   : 1980 (41 yrs)  MRN : [id number]     Patient Mailing Address [address] [place] [address]                                          [address] [facility] [41] , [address]           Insurance Plan Payor: BLUE CROSS / Plan[place] / Product Type: PPO /      Primary Coverage Subscriber ID : [id number]     Secondary Coverage:  N/A         Current Patient Class : INPATIENT  Admit Date : 2022     REQUESTED LEVEL OF CARE: INPATIENT  [107]                                                           Diagnosis : Encounter for elective induction of labor                          ICD10 Code : Encounter for elective induction of labor [Z34.90]  [O80] VAGINAL     Current Room and Bed 457/01     Admitting and Attending Info:  Admitting Provider : Josef Yeung MD   NPI: [id number]  Admitting Provider Phone. [phone]  Admitting Provider Address: [address] [place] [address]       BABY INFORMATION :     Name :  Riccardo Sanford    :   [birth year]22 (0 dy)      Delivery Type : Vaginal, Spontaneous [25*       Weight in Grams :   2970 g      GA  :   37      Apgar 1 Min :   9  [9]      Apgar 5 Min :   9  [9]      Unit:   SFE 4 MOTHER INFANT              MOM CHART--    Ayaz Perez     MRN: [id number]       Link to Baby  Comment        Baby's name MRN Account  Age Sex Admission Type   Lunda Shadow [id number] 22 3 days F Confirmed Discharge     OB History       2    Para   2    Term   2            AB        Living   2      SAB        IAB        Ectopic        Molar        Multiple   0    Live Births   2         # Outcome Date GA Labor/2nd Weight Sex Delivery Anes PTL Lv A1 A5   1 Term 07/16/17 39w5d 3h 17m / 1h 48m 3.572 kg F Vag-Spont Epidural  N Living 9 9   Name: Deborah Clemons   Location: Other   Delivering Clinician: Norm Tolbert MD      2 Term 02/12/22 37w0d 7h 04m / 0h 55m 2.97 kg F Vag-Spont Epidural N Living 9 9   Name: Lambert Maurice   Location: Other   Delivering Clinician: Norm Tolbert MD        Prenatal History     Most Recent Value   Did You Receive Prenatal Care Yes   Name Of OB Provider [facility]   Seen By MFM (Maternal Fetal Medicine)? Yes   Fetal Ultrasound Abnormalities/Concerns?  No   Infant Feeding Breast Milk   Circumcision Planned No   Pediatrician After Birth/ Follow Up Baby Visits [place]     Dating Summary    Working RAFAEL: 03/05/22 set by Nancy Harris RN on 08/13/21 based on Last Menstrual Period on 05/29/21     Based On RAFAEL GA Diff GA User Date   Last Menstrual Period on 05/29/21 (Approximate) 03/05/22 Working  Nancy Harris RN 08/13/21   Ultrasound on 07/29/21 03/05/22 Same 8w5d Nancy Harris RN 08/13/21     Prenatal Results      Prenatal Labs    Test Value Date Time   ABO/Rh * O positive  12/06/16    Antibody Scrn      Hgb      Hct      Platelets      Rubella      RPR      T. Pallidum Antibody      Urine      Hep B Surf Ag      Hep C      HIV      Gonorrhea      Chlamydia      TSH      GTT, 1 HR (Glucola)      GTT, Fasting      GTT, 1 HR      GTT, 2 HR      GTT, 3 HR        3rd Trimester    Test Value Date Time   Hgb      Hct      Platelets      Group B Strep      Antibody Scrn      TSH      T. Pallidum Antibody      Hep B Surf Ag      Gonorrhea      Chlamydia         Screening/Diagnostics    Test Value Date Time   AFP Only      AFP Tetra      Hgb Electrophoresis      Amniocentesis      Cystic Fibrosis      Thalessemia      Marcelo-Sachs      Canavan      PAP Smear      Beta HCG      NT      NIPT      COVID-19        Lab    Test Value Date Time   GTT, Fasting      GTT, 1HR      GTT, 2HR      GTT, 3HR RPR * non reactive  16    Beta HCG      CMV Ab      Toxoplasma Ab      Varicella Zoster Ab           Legend    *: Historical  View all results for this pregnancy       Kaushik Calvert [[id number]]      [place] Delivery    Birth date/time: 2022 18:29:00  Delivery type: Vaginal, Spontaneous  Complications: None    Labor Event Times    Labor onset date/time: 2022 1030  Dilation complete date/time: 2022 1734  Start pushing date/time: 2022 1815    Labor Events     labor?: No   steroids?: None  Antibiotics during labor?: No  Rupture date/time: 2022 1605  Rupture type: AROM  Fluid color: Clear  Fluid odor: None  Cervical ripening: None  Induction: Oxytocin, AROM  Induction date/time: 2022 103  Induction indications: Mild Preeclampsia  Augmentation: AROM  Augmentation date/time: 6050 4465  Complications: None    Delivery Providers    Delivering clinician: Janki Lockwood MD  Provider Role   Janki Lockwood MD Obstetrician   Jessica Hancock, DEEPIKA Primary Nurse   Caryl Burton, RN Primary [place] Nurse   Tavo Marks, DEEPIKA Charge Nurse     Anesthesia    Method: Epidural    Multiple Birth Onset Second Stage    Second Stage Start Date: 22 Second Stage Start Time: 4988     Operative Delivery    Forceps attempted?: No  Vacuum extractor attempted?: No    Presentation    Presentation: Vertex  Position: Left Occiput Anterior    Apgars    Living status: Living  Apgars:  1 min. :  5 min.:  10 min. :  15 min.:  20 min.:    Skin color:  1  1       Heart rate:  2  2       Reflex irritability:  2  2       Muscle tone:  2  2       Respiratory effort:  2  2       Total:  9  9       Apgars assigned by: [facility] RN    Resuscitation    Method: Suctioning-bulb, Tactile Stimulation    Shoulder Dystocia    Shoulder dystocia present?: No    Measurements    Weight: 2970 g  Weight (lbs): 6 lb 8.8 oz  Length: 50.8 cm  Length (in): 20\"  Head circumference: 33.5 cm  Chest circumference: 32 cm    Lacerations    Episiotomy: None    Lacerations: 1st  Repair Needed: Chromic 2-0  # of Repair Packets: 1  Suture Type and Size:   Suture Comment:   Estimated Blood Loss (mL):       Placenta    Placenta Date/Time: 2/12/2022 1834  Removal: Spontaneous  Appearance: Normal, Intact Placenta disposition: Discarded     Vaginal Counts    Initial count personnel: Linnea Beckham AND Eben Reilly RN  Final count personnel: SAME  Accurate final count?: Yes    Skin to Skin    Skin to skin initiated date/time: 2/12/2022 1845  Skin to skin with:  Mother        Delivery Summary History    Event User Date/Time   Signed Bruce Walker MD 2/12/2022 18:52:39   Opened for Addendum Marjorie Hilliard RN 2/12/2022 18:55:41   Signed Marjorie Hilliard [facility] 2/12/2022 89:11:80

## 2022-03-18 PROBLEM — U07.1 COVID-19 AFFECTING PREGNANCY IN THIRD TRIMESTER: Status: ACTIVE | Noted: 2022-01-24

## 2022-03-18 PROBLEM — O98.513 COVID-19 AFFECTING PREGNANCY IN THIRD TRIMESTER: Status: ACTIVE | Noted: 2022-01-24

## 2022-03-19 PROBLEM — O09.523 MULTIGRAVIDA OF ADVANCED MATERNAL AGE IN THIRD TRIMESTER: Status: ACTIVE | Noted: 2021-07-29

## 2022-03-19 PROBLEM — Z14.8 CARRIER OF SPINAL MUSCULAR ATROPHY: Status: ACTIVE | Noted: 2021-10-11

## 2022-03-19 PROBLEM — O14.90 PREECLAMPSIA: Status: ACTIVE | Noted: 2022-01-24

## 2022-03-30 PROBLEM — U07.1 COVID-19 AFFECTING PREGNANCY IN THIRD TRIMESTER: Status: RESOLVED | Noted: 2022-01-24 | Resolved: 2022-03-30

## 2022-03-30 PROBLEM — O09.523 MULTIGRAVIDA OF ADVANCED MATERNAL AGE IN THIRD TRIMESTER: Status: RESOLVED | Noted: 2021-07-29 | Resolved: 2022-03-30

## 2022-03-30 PROBLEM — O98.513 COVID-19 AFFECTING PREGNANCY IN THIRD TRIMESTER: Status: RESOLVED | Noted: 2022-01-24 | Resolved: 2022-03-30

## 2022-05-12 PROBLEM — O14.90 PREECLAMPSIA: Status: RESOLVED | Noted: 2022-01-24 | Resolved: 2022-05-12

## 2022-05-12 PROBLEM — Z80.41 FAMILY HISTORY OF OVARIAN CANCER: Status: ACTIVE | Noted: 2022-05-12

## 2022-06-08 NOTE — PROGRESS NOTES
Job Michelle  : 1980  Primary:   Secondary:  [address] @ [address] [address]  Phone: [phone]  Fax: [phone] Plan Frequency: one time a week for 90 days    Plan of Care/Certification Expiration Date: 22      PT Visit Info:   No data recorded    OUTPATIENT PHYSICAL THERAPY:OP NOTE TYPE: Treatment Note 2022       Episode  }Appt Desk              Treatment Diagnosis:  Lack of coordination (muscle incoordination) (R27.8)  Pelvic floor dysfunction in female (M62.98)  Generalized weakness (M62.81)  Stress incontinence (female) (male) (N39.3)  Medical/Referring Diagnosis:  Stress incontinence (female) (male) [N39.3]  Encounter for routine postpartum follow-up [Z39.2]  Referring Physician:  Bony Corea MD MD Orders:  PT Eval and Treat   Date of Onset:  No data recorded   Allergies:   Amoxicillin-pot clavulanate  Restrictions/Precautions:  No data recordedNo data recorded   Interventions Planned (Treatment may consist of any combination of the following):    Current Treatment Recommendations: Strengthening; ADL/Self-care training; Neuromuscular re-education; Manual Therapy - Soft Tissue Mobilization; Pain management; Home exercise program; Safety education & training; Equipment evaluation, education, & procurement; Therapeutic activities     Subjective Comments:     Initial:}     /10Post Session:        /10  Medications Last Reviewed:  2022  Updated Objective Findings:  See evaluation note from today  Treatment   THERAPEUTIC EXERCISE: (10 minutes):    Exercises per grid below to improve mobility, strength, and coordination. Required minimal visual, verbal, manual, and tactile cues to promote proper body alignment, promote proper body posture, promote proper body mechanics, and promote proper body breathing techniques. Progressed resistance, range, repetitions, and complexity of movement as indicated.    Date:  22 Date:   Date:     Activity/Exercise Parameters Parameters Parameters   Patient Education Discussed HEP and POC     Kegels Quick flicks and endurance holds     The kathleen Reviewed          All exercises performed with emphasis on kegel and breathing in order improve coordination, awareness and to minimize symptoms. MANUAL THERAPY: ( minutes): to improve soft tissue tone    Date Type Location Comments   6/9/2022 Internal assessment/treatment                                         (Used abbreviations: MET - muscle energy technique; SCS- Strain counter strain; CTM-Connective tissue mobilizations; CR- Contract/Relax; SP- Sustained pressure; PIT- Positional inhibition techniques; STM Soft -tissue mobilization; MM- Myofascial mobilization; TrP-Trigger point release; IASTM- Instrument assisted soft tissue mobilizations, TDN-Trigger point dry needling)    Pt gives verbal consent to internal vaginal assessment/treatment without chaperon present. NEURO REEDUCATION: () to improve control and coordination of pelvic floor     Date:   Date:   Date:     Activity/Exercise Parameters Parameters Parameters   Biofeedback With sEMG was utilized for coordination of PFM.                                                 THERAPEUTIC ACTIVITY: ( minutes):     TA Educational Topic Notes Date Completed   Pathology/Anatomy/PFM Function     Bladder health education     Urinary urge suppression     The knack     Voiding strategies     Nocturia tips     Bowel/Bladder log     Bowel health education     Constipation care     Diarrhea/Fecal leakage     Colonic massage     Toilet positioning     Defecation dynamics     Sources of fiber     Return to intercourse/Dilator training     Sexual positioning     Lubricants/vaginal moisturizers     Vaginal irritants     Body mechanics     Posture/ergonomics     Diaphragmatic breathing     Resources and technology           Treatment/Session Summary:    · Treatment Assessment: · Communication/Consultation:  None today  · Equipment provided today:  None  · Recommendations/Intent for next treatment session: Next visit will focus on coordination of PFM. Total Treatment Billable Duration:  10 minutes   Time In: 1500  Time Out: [address]  Dion Vann, PT       Charge Capture  }Post Session Pain  MedBridge Portal  MD Guidelines  Scanned Media  Benefits  MyChart    Future Appointments   Date Time Provider Zainab Gill   6/13/2022  1:00 PM Elester Sox, PT [facility]   6/20/2022  1:00 PM Elester Sox, PT [facility]   6/27/2022  1:00 PM Elester Sox, PT [facility]   7/11/2022  1:00 PM Elester Sox, PT [facility]   7/18/2022  1:00 PM Elester Sox, PT [facility]   7/25/2022  1:00 PM Elester Sox, PT [facility]   8/25/2022 10:15 AM Joni Holstein, MD [facility]

## 2022-06-08 NOTE — THERAPY EVALUATION
Veronica Shannon  : 1980  Primary:   Secondary:  [address] @ [employer] [facility] Walker [address] [address]  Phone: [phone]  Fax: [phone] Plan Frequency: one time a week for 90 days    Plan of Care/Certification Expiration Date: 22      PT Visit Info:    No data recorded    OUTPATIENT PHYSICAL THERAPY:OP NOTE TYPE: Initial Assessment 2022               Episode  Appt Desk         Treatment Diagnosis:  Lack of coordination (muscle incoordination) (R27.8)  Pelvic floor dysfunction in female (M62.98)  Generalized weakness (M62.81)  Stress incontinence (female) (male) (N39.3)  * No diagnoses found *  Medical/Referring Diagnosis:  Stress incontinence (female) (male) [N39.3]  Encounter for routine postpartum follow-up [Z39.2]  Referring Physician:  Christopher Gutiérrez MD MD Orders:  PT Eval and Treat   Return MD Appt:  Aug 2022  Date of Onset:  No data recorded   Allergies:  Amoxicillin-pot clavulanate  Restrictions/Precautions:    No data recordedNo data recorded   Medications Last Reviewed:  2022     SUBJECTIVE   History of Injury/Illness (Reason for Referral):  Ms. Eric Carabjal is a 38 yo female referred to pelvic floor physical therapy (PFPT) by Christopher Gutiérrez MD 2/2 Stress incontinence (female) (male) [N39.3]  Encounter for routine postpartum follow-up [Z39.2] Pt reports that symptoms began 4 months ago. She started having urinary leakage following the second pregnancy. After the first one jumping would cause leakage. When she was pregnant the second time she had horrible nausea/vomitting. Every time she vomited she would leak. Leakage is worse after the pregnancy but better now that it was. Urinary: Frequency 5-8 x/day, 0 x/night. Positive for stress urinary incontinence.    Negative for urge urinary incontinence, urinary urgency, urinary frequency, incomplete emptying, urinary hesitancy/intermittency, dysuria, hematuria, nocturia and nocturnal enuresis. Pt does not use pads for urinary protection; she would wear one if she went to jump or run pads per day (PPD). Fluid intake: more than 2 liters water/day; bladder irritants include: coffee 1/2 pot, occasional soda. Pt does not limit fluid intake due to bladder control. Bowel: Frequency once a take. Positive for none. Negative for pain with bowel movement, pushing/straining with bowel movement, fecal incontinence, constipation and incomplete emptying. Pt does not use pads for bowel protection; 0 pads per day (PPD). Use of stool softeners or laxatives? No    Sexual: Pt is  sexually active with male partners. Contraception/birth control: none. negative for dyspareunia. Reports dryness from post partum and uncomfortable where episiotomy scar. History of sexual abuse: No    Pelvic Organ Prolapse/Pelvic Pain: Location: none. OB History: Number of pregnancies: 2 Number of vaginal deliveries: 2 Number of c-sections: 0. Initial:      /10 Post Session:      /10  Past Medical History/Comorbidities:   Ms. Sky Lynn  has a past medical history of Gestational hypertension, Iron deficiency anemia, Nausea/vomiting in pregnancy, and Preeclampsia. Ms. Sky Lynn  has a past surgical history that includes [place] tooth extraction. Social History/Living Environment:   Lives With: Spouse     Prior Level of Function/Work/Activity:   Prior level of function: Independent  Occupation: Full time employment  Type of Occupation: research   Leisure & Hobbies: walking and gardening  No [address] recorded   Learning:   Does the patient/guardian have any barriers to learning?: No barriers  Will there be a co-learner?: No  What is the preferred language of the patient/guardian?: English  Is an  required?: No  How does the patient/guardian prefer to learn new concepts?: Listening; Reading; Demonstration; Pictures/Videos     Fall Risk Scale:    Total Score: 0  Gianna Crowell Risk: Low (0-24)           OBJECTIVE   OBSERVATION:   External Observations:   Voluntary contraction: [] absent     [x] present   Involuntary contraction: [] absent     [x] present   Involuntary relaxation: [] absent     [x] present   Perineal descent at rest: [] absent     [x] present   Perineal descent with bearing: [] absent     [x] present      Pelvic Floor Muscle (PFM) Assessment:   Vaginal vault size: [] decreased     [] increased     [x] WNL   Muscle volume: [] decreased     [x] WNL     [] Defect   PFM tension: [] decreased     [] increased     [x] WNL    Contraction ability:  Voluntary contraction: [] absent     [x] weak     [] moderate      [] strong  Voluntary relaxation: [] absent     [] partial     [x] complete   MMT: 2/5   PFM endurance: 3 seconds   Repetitions of endurance contractions: 4  Overflow: [x] absent     [] min     [] mod     [] severe / Compensatory mm groups include   Levator Avulsion: [] Negative  [] Positive    Tissue laxity test:  Anterior wall: [] minimum     [] moderate     [] severe      [x] WNL  Posterior wall: [] minimum     [] moderate     [] severe      [x] WNL      Palpation: via vaginal canal   Superficial Pelvic Floor Musculature (PFM): Tender? Intermediate PFM Tender? Deep PFM Tender? Superficial Transverse Perineal [] Right  [] Left  []DNT Deep Transverse Perineal [] Right  [] Left  []DNT Puborectalis [] Right  [] Left  []DNT   Ischiocavernosus [] Right  [] Left  []DNT Compressor Urethra [] Right  [] Left  []DNT Pubococcygeus [] Right  [] Left  []DNT   Bulbocavernosus [] Right  [] Left  []DNT   Ileococcygeus [] Right  [] Left  []DNT       Obturator Internus [] Right  [] Left  []DNT       Coccygeus [] Right  [] Left  []DNT               ASSESSMENT   Initial Assessment: Ceasar Watson presents with musculoskeletal limitations including reduced coordination and awareness of PFM, poor abdominal strength and decreased pelvic floor muscle (PFM) strength.   These limitations are impairing the patient's ability to properly coordinate perineal elevation and descent for normalized PFM function, contributing to urinary dysfunction. As a result, she is limited in her/his ability to participate in physical activities such as walking, swimming, or other exercise. Problem List: (Impacting functional limitations): Body Structures, Functions, Activity Limitations Requiring Skilled Therapeutic Intervention: Decreased body mechanics; Decreased strength; Decreased coordination     Therapy Prognosis:   Therapy Prognosis: Good     Assessment Complexity:   Low Complexity  PLAN   Effective Dates: 6-9-22 TO Plan of Care/Certification Expiration Date: 09/07/22     Frequency/Duration: Plan Frequency: one time a week for 90 days     Interventions Planned (Treatment may consist of any combination of the following):    Current Treatment Recommendations: Strengthening; ADL/Self-care training; Neuromuscular re-education; Manual Therapy - Soft Tissue Mobilization; Pain management; Home exercise program; Safety education & training; Equipment evaluation, education, & procurement; Therapeutic activities     Goals: (Goals have been discussed and agreed upon with patient.)  Short-Term Functional Goals: Time Frame: 6 weeks  1. Pt will demonstrate I with basic PFM HEP to improve awareness, coordination, and timing of PFM. 2. Patient will demonstrate understanding of and ability to teach back appropriate water intake, bladder irritants, toileting frequency, and positioning for improved self-management of symptoms. 3. Patient will demonstrate ability to isolate a pelvic floor contraction without breath holding and minimal to no accessory muscle use in order to implement the knack and/or urge suppression  4.  Patient will demonstrate appropriate co-contraction of the transversus abdominis and pelvic floor muscle group during exhalation in order to reduce IAP and improve functional task performance. Discharge Goals: Time Frame: 12 weeks  1. Patient will demonstrate ability to voluntarily contract the pelvic floor muscles prior to a cough or sneeze for decreased leakage during increases in intra-abdominal pressure. 2. Patient will be able to perform jumping without urinary leakage for improved participation in recreational activities and ADL's.   3. Patient will demonstrate independence with an advanced HEP for general conditioning, core stabilization, and mobility to facilitate carry over and independent management of symptoms. Pelvic Floor Impact Questionnaire--short form 7 (PFIQ-7):  Score:  Initial:   · Bladder or Urine: 0/100  · Bowel or Rectum: 0/100  · Vagina or Pelvis: 0/100 Most Recent: X (Date: -- )  · Bladder or Urine: X/100  · Bowel or Rectum: X/100  · Vagina or Pelvis: X/100   Interpretation of Score: Each of the 7 sections is scored on a scale from 0-3; 0 representing \"Not at all\", 3 representing \"Quite a bit\". The mean value is taken from all the answered items, then multiplied by 100 to obtain the scale score, ranging from 0-100. This process is repeated for each column representing bowel, bladder, and pelvic pain. Medical Necessity:   Patient demonstrates good rehab potential due to higher previous functional level. Reason For Services/Other Comments:  Patient continues to require skilled intervention due to above mentioned deficits. Total Duration:  Time In: 1500  Time Out: 1600    Regarding Michelle Davalos therapy, I certify that the treatment plan above will be carried out by a therapist or under their direction. Thank you for this referral,  Benny Zhao. Jennie De La Cruz     Referring Physician Signature: Shahriar Velasquez MD No Signature is Required for this note.         Post Session Pain  Charge Capture   POC Link  Treatment Note Link  MD Guidelines  [facility]

## 2022-06-09 ENCOUNTER — HOSPITAL ENCOUNTER (OUTPATIENT)
Dept: PHYSICAL THERAPY | Age: 42
Setting detail: RECURRING SERIES
Discharge: HOME | End: 2022-06-12
Payer: COMMERCIAL

## 2022-06-09 PROCEDURE — 97110 THERAPEUTIC EXERCISES: CPT

## 2022-06-09 PROCEDURE — 97161 PT EVAL LOW COMPLEX 20 MIN: CPT

## 2022-06-13 ENCOUNTER — HOSPITAL ENCOUNTER (OUTPATIENT)
Dept: PHYSICAL THERAPY | Age: 42
Setting detail: RECURRING SERIES
Discharge: HOME | End: 2022-06-16
Payer: COMMERCIAL

## 2022-06-13 PROCEDURE — 97110 THERAPEUTIC EXERCISES: CPT

## 2022-06-13 ASSESSMENT — PAIN SCALES - GENERAL: PAINLEVEL_OUTOF10: 0

## 2022-06-13 NOTE — PROGRESS NOTES
Nicolás Santacruz  : 1980  Primary:   Secondary:  [address] [facility]  [place] [address]  Phone: [phone]  Fax: [phone] Plan Frequency: one time a week for 90 days    Plan of Care/Certification Expiration Date: 22      PT Visit Info:   No data recorded    OUTPATIENT PHYSICAL THERAPY:OP NOTE TYPE: Treatment Note 2022       Episode  }Appt Desk              Treatment Diagnosis:  Lack of coordination (muscle incoordination) (R27.8)  Pelvic floor dysfunction in female (M62.98)  Generalized weakness (M62.81)  Stress incontinence (female) (male) (N39.3)  Medical/Referring Diagnosis:  Stress incontinence (female) (male) [N39.3]  Encounter for routine postpartum follow-up [Z39.2]  Referring Physician:  Nara Maguire MD MD Orders:  PT Eval and Treat   Date of Onset:  No data recorded   Allergies:   Amoxicillin-pot clavulanate  Restrictions/Precautions:  No data recordedNo data recorded   Interventions Planned (Treatment may consist of any combination of the following):    Current Treatment Recommendations: Strengthening; ADL/Self-care training; Neuromuscular re-education; Manual Therapy - Soft Tissue Mobilization; Pain management; Home exercise program; Safety education & training; Equipment evaluation, education, & procurement; Therapeutic activities     Subjective Comments:  Patient reports no changes since last visit. Initial:}    0/10Post Session:        /10  Medications Last Reviewed:  2022  Updated Objective Findings:  See below  Ms. Anne Marie Hawkins is a 38 yo female referred to pelvic floor physical therapy (PFPT) by Nara Maguire MD 2/2 Stress incontinence (female) (male) [N39.3]  Encounter for routine postpartum follow-up [Z39.2] Pt reports that symptoms began 4 months ago. She started having urinary leakage following the second pregnancy. After the first one jumping would cause leakage.  When she was pregnant the second time she had horrible nausea/vomitting. Every time she vomited she would leak. Leakage is worse after the pregnancy but better now that it was.       Urinary: Frequency 5-8 x/day, 0 x/night. Positive for stress urinary incontinence. Negative for urge urinary incontinence, urinary urgency, urinary frequency, incomplete emptying, urinary hesitancy/intermittency, dysuria, hematuria, nocturia and nocturnal enuresis. Pt does not use pads for urinary protection; she would wear one if she went to jump or run pads per day (PPD). Fluid intake: more than 2 liters water/day; bladder irritants include: coffee 1/2 pot, occasional soda. Pt does not limit fluid intake due to bladder control.     Bowel: Frequency once a take. Positive for none. Negative for pain with bowel movement, pushing/straining with bowel movement, fecal incontinence, constipation and incomplete emptying. Pt does not use pads for bowel protection; 0 pads per day (PPD). Use of stool softeners or laxatives? No     Sexual: Pt is  sexually active with male partners. Contraception/birth control: none. negative for dyspareunia. Reports dryness from post partum and uncomfortable where episiotomy scar. History of sexual abuse: No     Pelvic Organ Prolapse/Pelvic Pain: Location: none.     OB History: Number of pregnancies: 2 Number of vaginal deliveries: 2 Number of c-sections: 0. External Observations:  · Voluntary contraction: []? absent     [x]? present  · Involuntary contraction: []? absent     [x]? present  · Involuntary relaxation: []? absent     [x]? present  · Perineal descent at rest: []? absent     [x]? present  · Perineal descent with bearing: []? absent     [x]? present        Pelvic Floor Muscle (PFM) Assessment:  · Vaginal vault size: []? decreased     []? increased     [x]? WNL  · Muscle volume: []? decreased     [x]? WNL     []? Defect  · PFM tension: []? decreased     []? increased     [x]?  WNL     Contraction ability:  Voluntary contraction: []? absent     [x]? weak     []? moderate      []? strong  Voluntary relaxation: []? absent     []? partial     [x]? complete   MMT: 2/5   PFM endurance: 3 seconds   Repetitions of endurance contractions: 4  Overflow: [x]? absent     []? min     []? mod     []? severe / Compensatory mm groups include   Levator Avulsion: []? Negative  []? Positive     Tissue laxity test:  Anterior wall: []? minimum     []? moderate     []? severe      [x]? WNL  Posterior wall: []? minimum     []? moderate     []? severe      [x]? WNL        Palpation: via vaginal canal   Superficial Pelvic Floor Musculature (PFM): Tender? Intermediate PFM Tender? Deep PFM Tender? Superficial Transverse Perineal []? Right  []? Left  []? DNT Deep Transverse Perineal []? Right  []? Left  []? DNT Puborectalis []? Right  []? Left  []? DNT   Ischiocavernosus []? Right  []? Left  []? DNT Compressor Urethra []? Right  []? Left  []? DNT Pubococcygeus []? Right  []? Left  []? DNT   Bulbocavernosus []? Right  []? Left  []? DNT     Ileococcygeus []? Right  []? Left  []? DNT           Obturator Internus []? Right  []? Left  []? DNT           Coccygeus []? Right  []? Left  []? DNT             Treatment   THERAPEUTIC EXERCISE: (55 minutes):    Exercises per grid below to improve mobility, strength, and coordination. Required minimal visual, verbal, manual, and tactile cues to promote proper body alignment, promote proper body posture, promote proper body mechanics, and promote proper body breathing techniques. Progressed resistance, range, repetitions, and complexity of movement as indicated.    Date:  6-9-22 Date:  6-13-22 Date:     Activity/Exercise Parameters Parameters Parameters   Patient Education Discussed HEP and POC     Kegels Quick flicks and endurance holds     The kaleback Reviewed       2nd position bridge  x20 yellow ball      clamshells  x20 blue band B    Seated march  x20 blue band B    Sit to stand  x20      Quadruped leg extension  x20 B      Rows    Green band x 20    Extensions    Green band x 20    Chops    Green band x 20 all directions    Sidestep  Green band x 20 B at door             All exercises performed with emphasis on kegel and breathing in order improve coordination, awareness and to minimize symptoms. MANUAL THERAPY: ( minutes): to improve soft tissue tone    Date Type Location Comments   6/13/2022 Internal assessment/treatment                                         (Used abbreviations: MET - muscle energy technique; SCS- Strain counter strain; CTM-Connective tissue mobilizations; CR- Contract/Relax; SP- Sustained pressure; PIT- Positional inhibition techniques; STM Soft -tissue mobilization; MM- Myofascial mobilization; TrP-Trigger point release; IASTM- Instrument assisted soft tissue mobilizations, TDN-Trigger point dry needling)    Pt gives verbal consent to internal vaginal assessment/treatment without chaperon present. NEURO REEDUCATION: () to improve control and coordination of pelvic floor     Date:   Date:   Date:     Activity/Exercise Parameters Parameters Parameters   Biofeedback With sEMG was utilized for coordination of PFM. THERAPEUTIC ACTIVITY: ( minutes):     TA Educational Topic Notes Date Completed   Pathology/Anatomy/PFM Function     Bladder health education     Urinary urge suppression     The knack     Voiding strategies     Nocturia tips     Bowel/Bladder log     Bowel health education     Constipation care     Diarrhea/Fecal leakage     Colonic massage     Toilet positioning     Defecation dynamics     Sources of fiber     Return to intercourse/Dilator training     Sexual positioning     Lubricants/vaginal moisturizers     Vaginal irritants     Body mechanics     Posture/ergonomics     Diaphragmatic breathing     Resources and technology           Treatment/Session Summary:    · Treatment Assessment:  Patient able to complete ther ex.  Updated HEP  · Communication/Consultation:  None today  · Equipment provided today:  None  · Recommendations/Intent for next treatment session: Next visit will focus on coordination of PFM. Total Treatment Billable Duration:  55 minutes there ex  Time In: 1300  Time Out: 175 Celestine Diaz, PT       Charge Capture  }Post Session Pain  MedBridge Portal  MD Guidelines  Scanned Media  Benefits  MyChart    Future Appointments   Date Time Provider Zainab Gill   6/20/2022  1:00 PM Onita Pals, PT [facility]   6/27/2022  [facility]   7/11/2022  1:00 PM Onita Pals, PT [facility]   7/18/2022  1:00 PM Onita Pals, PT [facility]   7/25/2022  1:00 PM Onita Pals, PT [facility]   8/25/2022 10:15 AM Sandra Henry MD [facility]

## 2022-06-16 NOTE — PROGRESS NOTES
Josee Waddell  : 1980  Primary:   Secondary:  [address] [address]  Phone: [phone]  Fax: [phone] Plan Frequency: one time a week for 90 days    Plan of Care/Certification Expiration Date: 22      PT Visit Info:   No data recorded    OUTPATIENT PHYSICAL THERAPY:OP NOTE TYPE: Treatment Note 2022       Episode  }Appt Desk              Treatment Diagnosis:  Lack of coordination (muscle incoordination) (R27.8)  Pelvic floor dysfunction in female (M62.98)  Generalized weakness (M62.81)  Stress incontinence (female) (male) (N39.3)  Medical/Referring Diagnosis:  Stress incontinence (female) (male) [N39.3]  Encounter for routine postpartum follow-up [Z39.2]  Referring Physician:  Elsi Alonso MD MD Orders:  PT Eval and Treat   Date of Onset:  No data recorded   Allergies:   Amoxicillin-pot clavulanate  Restrictions/Precautions:  No data recordedNo data recorded   Interventions Planned (Treatment may consist of any combination of the following):    Current Treatment Recommendations: Strengthening; ADL/Self-care training; Neuromuscular re-education; Manual Therapy - Soft Tissue Mobilization; Pain management; Home exercise program; Safety education & training; Equipment evaluation, education, & procurement; Therapeutic activities     Subjective Comments:    Patient reports she was able to catch a sneeze the right way. Initial:}    0/10Post Session:        /10  Medications Last Reviewed:  2022  Updated Objective Findings:  See below  Ms. Jarek Haley is a 36 yo female referred to pelvic floor physical therapy (PFPT) by Elsi Alonso MD 2/2 Stress incontinence (female) (male) [N39.3]  Encounter for routine postpartum follow-up [Z39.2] Pt reports that symptoms began 4 months ago. She started having urinary leakage following the second pregnancy. After the first one jumping would cause leakage.  When she was pregnant the second time she had horrible nausea/vomitting. Every time she vomited she would leak. Leakage is worse after the pregnancy but better now that it was.       Urinary: Frequency 5-8 x/day, 0 x/night. Positive for stress urinary incontinence. Negative for urge urinary incontinence, urinary urgency, urinary frequency, incomplete emptying, urinary hesitancy/intermittency, dysuria, hematuria, nocturia and nocturnal enuresis. Pt does not use pads for urinary protection; she would wear one if she went to jump or run pads per day (PPD). Fluid intake: more than 2 liters water/day; bladder irritants include: coffee 1/2 pot, occasional soda. Pt does not limit fluid intake due to bladder control.     Bowel: Frequency once a take. Positive for none. Negative for pain with bowel movement, pushing/straining with bowel movement, fecal incontinence, constipation and incomplete emptying. Pt does not use pads for bowel protection; 0 pads per day (PPD). Use of stool softeners or laxatives? No     Sexual: Pt is  sexually active with male partners. Contraception/birth control: none. negative for dyspareunia. Reports dryness from post partum and uncomfortable where episiotomy scar. History of sexual abuse: No     Pelvic Organ Prolapse/Pelvic Pain: Location: none.     OB History: Number of pregnancies: 2 Number of vaginal deliveries: 2 Number of c-sections: 0. External Observations:  · Voluntary contraction: []? absent     [x]? present  · Involuntary contraction: []? absent     [x]? present  · Involuntary relaxation: []? absent     [x]? present  · Perineal descent at rest: []? absent     [x]? present  · Perineal descent with bearing: []? absent     [x]? present        Pelvic Floor Muscle (PFM) Assessment:  · Vaginal vault size: []? decreased     []? increased     [x]? WNL  · Muscle volume: []? decreased     [x]? WNL     []? Defect  · PFM tension: []? decreased     []? increased     [x]? WNL     Contraction ability:  Voluntary contraction: []? absent     [x]? weak     []? moderate      []? strong  Voluntary relaxation: []? absent     []? partial     [x]? complete   MMT: 2/5   PFM endurance: 3 seconds   Repetitions of endurance contractions: 4  Overflow: [x]? absent     []? min     []? mod     []? severe / Compensatory mm groups include   Levator Avulsion: []? Negative  []? Positive     Tissue laxity test:  Anterior wall: []? minimum     []? moderate     []? severe      [x]? WNL  Posterior wall: []? minimum     []? moderate     []? severe      [x]? WNL        Palpation: via vaginal canal   Superficial Pelvic Floor Musculature (PFM): Tender? Intermediate PFM Tender? Deep PFM Tender? Superficial Transverse Perineal []? Right  []? Left  []? DNT Deep Transverse Perineal []? Right  []? Left  []? DNT Puborectalis []? Right  []? Left  []? DNT   Ischiocavernosus []? Right  []? Left  []? DNT Compressor Urethra []? Right  []? Left  []? DNT Pubococcygeus []? Right  []? Left  []? DNT   Bulbocavernosus []? Right  []? Left  []? DNT     Ileococcygeus []? Right  []? Left  []? DNT           Obturator Internus []? Right  []? Left  []? DNT           Coccygeus []? Right  []? Left  []? DNT             Treatment   THERAPEUTIC EXERCISE: (55 minutes):    Exercises per grid below to improve mobility, strength, and coordination. Required minimal visual, verbal, manual, and tactile cues to promote proper body alignment, promote proper body posture, promote proper body mechanics, and promote proper body breathing techniques. Progressed resistance, range, repetitions, and complexity of movement as indicated.    Date:  6-9-22 Date:  6-13-22 Date:  6-20-22   Activity/Exercise Parameters Parameters Parameters   Patient Education Discussed HEP and POC     Kegels Quick flicks and endurance holds     The kaleback Reviewed       3rd position bridge   Blue band x 20     Fire hydrants   Blue band x 20 B   2nd position bridge  x20 yellow ball clamshells  x20 blue band B    Seated march  x20 blue band B    Sit squat   x20 blue band     Sit to stand  x20      Quadruped leg extension  x20 B      Rows    Green band x 20 Green band x 20   Extensions    Green band x 20 Green band x 20   Chops    Green band x 20 all directions Green band x 20 all directions   Sidestep  Green band x 20 B at door Green band x 20 all directions   Pelvic tilt with march   x20 B     iso abdominal   5 sec hold x 20   90/90 heel tap   x20 b     Bear plank   5 sec hold x 10                          All exercises performed with emphasis on kegel and breathing in order improve coordination, awareness and to minimize symptoms. MANUAL THERAPY: ( minutes): to improve soft tissue tone    Date Type Location Comments   6/20/2022 Internal assessment/treatment                                         (Used abbreviations: MET - muscle energy technique; SCS- Strain counter strain; CTM-Connective tissue mobilizations; CR- Contract/Relax; SP- Sustained pressure; PIT- Positional inhibition techniques; STM Soft -tissue mobilization; MM- Myofascial mobilization; TrP-Trigger point release; IASTM- Instrument assisted soft tissue mobilizations, TDN-Trigger point dry needling)    Pt gives verbal consent to internal vaginal assessment/treatment without chaperon present. NEURO REEDUCATION: () to improve control and coordination of pelvic floor     Date:   Date:   Date:     Activity/Exercise Parameters Parameters Parameters   Biofeedback With sEMG was utilized for coordination of PFM.                                                 THERAPEUTIC ACTIVITY: ( minutes):     TA Educational Topic Notes Date Completed   Pathology/Anatomy/PFM Function     Bladder health education     Urinary urge suppression     The knack     Voiding strategies     Nocturia tips     Bowel/Bladder log     Bowel health education     Constipation care     Diarrhea/Fecal leakage     Colonic massage     Toilet positioning Defecation dynamics     Sources of fiber     Return to intercourse/Dilator training     Sexual positioning     Lubricants/vaginal moisturizers     Vaginal irritants     Body mechanics     Posture/ergonomics     Diaphragmatic breathing     Resources and technology           Treatment/Session Summary:    · Treatment Assessment:  Patient progressing with ther ex  · Communication/Consultation:  None today  · Equipment provided today:  None  · Recommendations/Intent for next treatment session: Next visit will focus on coordination of PFM. Total Treatment Billable Duration:  55 minutes there ex  Time In: 1300  Time Out: 175 [facility] Dr. Colin Grover, PT       Charge Capture  }Post Session Pain  MedBridge Portal  MD Guidelines  Scanned Media  Benefits  MyChart    Future Appointments   Date Time Provider Zainab Gill   6/27/2022  1:00 PM Edel Jenkins, PT [facility]   7/11/2022  1:00 PM Edel Jenkins, PT [facility]   7/18/2022  1:00 PM Edel Jenkins, PT [facility]   7/25/2022  1:00 PM Edel Jenkins, PT [facility]   8/25/2022 10:15 AM Cristian Kuo MD [facility]

## 2022-06-20 ENCOUNTER — HOSPITAL ENCOUNTER (OUTPATIENT)
Dept: PHYSICAL THERAPY | Age: 42
Setting detail: RECURRING SERIES
Discharge: HOME | End: 2022-06-23
Payer: COMMERCIAL

## 2022-06-20 PROCEDURE — 97110 THERAPEUTIC EXERCISES: CPT

## 2022-06-20 ASSESSMENT — PAIN SCALES - GENERAL: PAINLEVEL_OUTOF10: 0

## 2022-06-23 NOTE — PROGRESS NOTES
she had horrible nausea/vomitting. Every time she vomited she would leak. Leakage is worse after the pregnancy but better now that it was.       Urinary: Frequency 5-8 x/day, 0 x/night. Positive for stress urinary incontinence. Negative for urge urinary incontinence, urinary urgency, urinary frequency, incomplete emptying, urinary hesitancy/intermittency, dysuria, hematuria, nocturia and nocturnal enuresis. Pt does not use pads for urinary protection; she would wear one if she went to jump or run pads per day (PPD). Fluid intake: more than 2 liters water/day; bladder irritants include: coffee 1/2 pot, occasional soda. Pt does not limit fluid intake due to bladder control.     Bowel: Frequency once a take. Positive for none. Negative for pain with bowel movement, pushing/straining with bowel movement, fecal incontinence, constipation and incomplete emptying. Pt does not use pads for bowel protection; 0 pads per day (PPD). Use of stool softeners or laxatives? No     Sexual: Pt is  sexually active with male partners. Contraception/birth control: none. negative for dyspareunia. Reports dryness from post partum and uncomfortable where episiotomy scar. History of sexual abuse: No     Pelvic Organ Prolapse/Pelvic Pain: Location: none.     OB History: Number of pregnancies: 2 Number of vaginal deliveries: 2 Number of c-sections: 0. External Observations:  · Voluntary contraction: []? absent     [x]? present  · Involuntary contraction: []? absent     [x]? present  · Involuntary relaxation: []? absent     [x]? present  · Perineal descent at rest: []? absent     [x]? present  · Perineal descent with bearing: []? absent     [x]? present        Pelvic Floor Muscle (PFM) Assessment:  · Vaginal vault size: []? decreased     []? increased     [x]? WNL  · Muscle volume: []? decreased     [x]? WNL     []? Defect  · PFM tension: []? decreased     []? increased     [x]?  WNL     Contraction ability:  Voluntary contraction: []? absent     [x]? weak     []? moderate      []? strong  Voluntary relaxation: []? absent     []? partial     [x]? complete   MMT: 2/5   PFM endurance: 3 seconds   Repetitions of endurance contractions: 4  Overflow: [x]? absent     []? min     []? mod     []? severe / Compensatory mm groups include   Levator Avulsion: []? Negative  []? Positive     Tissue laxity test:  Anterior wall: []? minimum     []? moderate     []? severe      [x]? WNL  Posterior wall: []? minimum     []? moderate     []? severe      [x]? WNL        Palpation: via vaginal canal   Superficial Pelvic Floor Musculature (PFM): Tender? Intermediate PFM Tender? Deep PFM Tender? Superficial Transverse Perineal []? Right  []? Left  []? DNT Deep Transverse Perineal []? Right  []? Left  []? DNT Puborectalis []? Right  []? Left  []? DNT   Ischiocavernosus []? Right  []? Left  []? DNT Compressor Urethra []? Right  []? Left  []? DNT Pubococcygeus []? Right  []? Left  []? DNT   Bulbocavernosus []? Right  []? Left  []? DNT     Ileococcygeus []? Right  []? Left  []? DNT           Obturator Internus []? Right  []? Left  []? DNT           Coccygeus []? Right  []? Left  []? DNT             Treatment   THERAPEUTIC EXERCISE: (55 minutes):    Exercises per grid below to improve mobility, strength, and coordination. Required minimal visual, verbal, manual, and tactile cues to promote proper body alignment, promote proper body posture, promote proper body mechanics, and promote proper body breathing techniques. Progressed resistance, range, repetitions, and complexity of movement as indicated.    Date:  6-9-22 Date:  6-13-22 Date:  6-20-22 Date:  6-27-22   Activity/Exercise Parameters Parameters Parameters    Patient Education Discussed HEP and POC      Kegels Quick flicks and endurance holds      The kathleen Reviewed        3rd position bridge   Blue band x 20   Blue band x 20   Fire hydrants   Blue band x 20 B Blue band x 20 B   2nd minutes):     TA Educational Topic Notes Date Completed   Pathology/Anatomy/PFM Function     Bladder health education     Urinary urge suppression     The kaleback     Voiding strategies     Nocturia tips     Bowel/Bladder log     Bowel health education     Constipation care     Diarrhea/Fecal leakage     Colonic massage     Toilet positioning     Defecation dynamics     Sources of fiber     Return to intercourse/Dilator training     Sexual positioning     Lubricants/vaginal moisturizers     Vaginal irritants     Body mechanics     Posture/ergonomics     Diaphragmatic breathing     Resources and technology           Treatment/Session Summary:    · Treatment Assessment:  Patient fatigues with increased complexity of exercises  · Communication/Consultation:  None today  · Equipment provided today:  None  · Recommendations/Intent for next treatment session: Next visit will focus on coordination of PFM. Total Treatment Billable Duration:  55 minutes there ex  Time In: 1300  Time Out: 175 Celestine Marrero, PT       Charge Capture  }Post Session Pain  MedBridge Portal  MD Guidelines  Scanned Media  Benefits  MyChart    Future Appointments   Date Time Provider Zainab Gill   7/11/2022  1:00 PM Radha Newell, PT [facility]   7/18/2022  1:00 PM [facility]   7/25/2022  1:00 PM [facility]   8/25/2022 10:15 AM Ruth Gray MD [facility]

## 2022-06-27 ENCOUNTER — HOSPITAL ENCOUNTER (OUTPATIENT)
Dept: PHYSICAL THERAPY | Age: 42
Setting detail: RECURRING SERIES
Discharge: HOME | End: 2022-06-30
Payer: COMMERCIAL

## 2022-06-27 PROCEDURE — 97110 THERAPEUTIC EXERCISES: CPT

## 2022-06-27 ASSESSMENT — PAIN SCALES - GENERAL: PAINLEVEL_OUTOF10: 0
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
## 2022-07-11 ENCOUNTER — HOSPITAL ENCOUNTER (OUTPATIENT)
Dept: PHYSICAL THERAPY | Age: 42
Setting detail: RECURRING SERIES
Discharge: HOME | End: 2022-07-14
Payer: COMMERCIAL

## 2022-07-11 PROCEDURE — 97110 THERAPEUTIC EXERCISES: CPT

## 2022-07-11 ASSESSMENT — PAIN SCALES - GENERAL: PAINLEVEL_OUTOF10: 0

## 2022-07-14 NOTE — PROGRESS NOTES
Danial Ochoa  : 1980  Primary:   Secondary:  [address] @ [address] [facility] [place] [address]  Phone: [phone]  Fax: [phone] Plan Frequency: one time a week for 90 days    Plan of Care/Certification Expiration Date: 22      PT Visit Info:   No data recorded    OUTPATIENT PHYSICAL THERAPY:OP NOTE TYPE: Treatment Note 2022       Episode  }Appt Desk              Treatment Diagnosis:  Lack of coordination (muscle incoordination) (R27.8)  Pelvic floor dysfunction in female (M62.98)  Generalized weakness (M62.81)  Stress incontinence (female) (male) (N39.3)  Medical/Referring Diagnosis:  Stress incontinence (female) (male) [N39.3]  Encounter for routine postpartum follow-up [Z39.2]  Referring Physician:  Saint Pellet, MD MD Orders:  PT Eval and Treat   Date of Onset:  No data recorded   Allergies:   Amoxicillin-pot clavulanate  Restrictions/Precautions:  No data recordedNo data recorded   Interventions Planned (Treatment may consist of any combination of the following):    Current Treatment Recommendations: Strengthening; ADL/Self-care training; Neuromuscular re-education; Manual Therapy - Soft Tissue Mobilization; Pain management; Home exercise program; Safety education & training; Equipment evaluation, education, & procurement; Therapeutic activities     Subjective Comments:    Patient reports no changes. Still hasn't had leakage. Initial:}    0/10Post Session:        /10  Medications Last Reviewed:  2022  Updated Objective Findings:  See below  Ms. Marian Camacho is a 36 yo female referred to pelvic floor physical therapy (PFPT) by Saint Pellet, MD 2/2 Stress incontinence (female) (male) [N39.3]  Encounter for routine postpartum follow-up [Z39.2] Pt reports that symptoms began 4 months ago. She started having urinary leakage following the second pregnancy. After the first one jumping would cause leakage.  When she was pregnant the second time she had horrible nausea/vomitting. Every time she vomited she would leak. Leakage is worse after the pregnancy but better now that it was. Urinary: Frequency 5-8 x/day, 0 x/night. Positive for stress urinary incontinence. Negative for urge urinary incontinence, urinary urgency, urinary frequency, incomplete emptying, urinary hesitancy/intermittency, dysuria, hematuria, nocturia and nocturnal enuresis. Pt does not use pads for urinary protection; she would wear one if she went to jump or run pads per day (PPD). Fluid intake: more than 2 liters water/day; bladder irritants include: coffee 1/2 pot, occasional soda. Pt does not limit fluid intake due to bladder control. Bowel: Frequency once a take. Positive for none. Negative for pain with bowel movement, pushing/straining with bowel movement, fecal incontinence, constipation and incomplete emptying. Pt does not use pads for bowel protection; 0 pads per day (PPD). Use of stool softeners or laxatives? No     Sexual: Pt is  sexually active with male partners. Contraception/birth control: none. negative for dyspareunia. Reports dryness from post partum and uncomfortable where episiotomy scar. History of sexual abuse: No     Pelvic Organ Prolapse/Pelvic Pain: Location: none. OB History: Number of pregnancies: 2 Number of vaginal deliveries: 2 Number of c-sections: 0.   External Observations:  Voluntary contraction: [] absent     [x] present  Involuntary contraction: [] absent     [x] present  Involuntary relaxation: [] absent     [x] present  Perineal descent at rest: [] absent     [x] present  Perineal descent with bearing: [] absent     [x] present        Pelvic Floor Muscle (PFM) Assessment:  Vaginal vault size: [] decreased     [] increased     [x] WNL  Muscle volume: [] decreased     [x] WNL     [] Defect  PFM tension: [] decreased     [] increased     [x] WNL     Contraction ability:  Voluntary contraction: [] absent     [x] weak     [] moderate      [] strong  Voluntary relaxation: [] absent     [] partial     [x] complete   MMT: 2/5   PFM endurance: 3 seconds   Repetitions of endurance contractions: 4  Overflow: [x] absent     [] min     [] mod     [] severe / Compensatory mm groups include   Levator Avulsion: [] Negative  [] Positive     Tissue laxity test:  Anterior wall: [] minimum     [] moderate     [] severe      [x] WNL  Posterior wall: [] minimum     [] moderate     [] severe      [x] WNL        Palpation: via vaginal canal   Superficial Pelvic Floor Musculature (PFM): Tender? Intermediate PFM Tender? Deep PFM Tender? Superficial Transverse Perineal [] Right  [] Left  []DNT Deep Transverse Perineal [] Right  [] Left  []DNT Puborectalis [] Right  [] Left  []DNT   Ischiocavernosus [] Right  [] Left  []DNT Compressor Urethra [] Right  [] Left  []DNT Pubococcygeus [] Right  [] Left  []DNT   Bulbocavernosus [] Right  [] Left  []DNT     Ileococcygeus [] Right  [] Left  []DNT           Obturator Internus [] Right  [] Left  []DNT           Coccygeus [] Right  [] Left  []DNT             Treatment   THERAPEUTIC EXERCISE: (55 minutes):    Exercises per grid below to improve mobility, strength, and coordination. Required minimal visual, verbal, manual, and tactile cues to promote proper body alignment, promote proper body posture, promote proper body mechanics, and promote proper body breathing techniques. Progressed resistance, range, repetitions, and complexity of movement as indicated.    Date:  6-9-22 Date:  6-13-22 Date:  6-20-22 Date:  6-27-22 Date:  7-11-22 Date:  7-18-22   Activity/Exercise Parameters Parameters Parameters      Patient Education Discussed HEP and POC        Kegels Quick flicks and endurance holds        The kathleen Reviewed          3rd position bridge   Blue band x 20   Blue band x 20 Blue band x 20    Fire hydrants   Blue band x 20 B Blue band x 20 B Blue band x 20    2nd position bridge  x20 yellow ball      X20 with 5# weight at hips   clamshells  x20 blue band B       Seated march  x20 blue band B       Sit squat   x20 blue band     X20 5# weight   Sit to stand  x20         Quadruped leg extension  x20 B     With yellow resistance band x 20 B    Rows    Green band x 20 Green band x 20      Extensions    Green band x 20 Green band x 20      Chops    Green band x 20 all directions Green band x 20 all directions      Sidestep  Green band x 20 B at door Green band x 20 all directions  Green band x 20 all directions    Pelvic tilt with march   x20 B        iso abdominal   5 sec hold x 20 5 sec hold x 20  In 90/90    90/90 heel tap   x20 b   x20 b  Holding 5# weight   Bear plank   5 sec hold x 10   5 sec hold x 10 5 sec hold x 10      Modified side planks           Pelvic tilt with SLR    x20 B   x20 B x20 B   Quadruped crunch    x20 B   x20 B x20 B   Half kneel chop    x20 B 5#   x20 B 5# x20 B 5#   Lunge static    x20 b   x20 b    Sit squat    5# x 20   5# x 20    Seated march on ball    x20   x20 maria elena arm/leg x20 maria elena arm/leg   Plank on ball (modified)    To fatigue   To fatigue To fatigue, full plank   Sidestep squat    x20 with 5#     Side plank with clamshell     x20 B   X20 B with 5# weight      All exercises performed with emphasis on kegel and breathing in order improve coordination, awareness and to minimize symptoms.     MANUAL THERAPY: ( minutes): to improve soft tissue tone    Date Type Location Comments   7/18/2022 Internal assessment/treatment                                         (Used abbreviations: MET - muscle energy technique; SCS- Strain counter strain; CTM-Connective tissue mobilizations; CR- Contract/Relax; SP- Sustained pressure; PIT- Positional inhibition techniques; STM Soft -tissue mobilization; MM- Myofascial mobilization; TrP-Trigger point release; IASTM- Instrument assisted soft tissue mobilizations, TDN-Trigger point dry needling)    Pt gives verbal consent to internal vaginal assessment/treatment without chaperon present. NEURO REEDUCATION: () to improve control and coordination of pelvic floor     Date:   Date:   Date:     Activity/Exercise Parameters Parameters Parameters   Biofeedback With sEMG was utilized for coordination of PFM. THERAPEUTIC ACTIVITY: ( minutes):     TA Educational Topic Notes Date Completed   Pathology/Anatomy/PFM Function     Bladder health education     Urinary urge suppression     The knack     Voiding strategies     Nocturia tips     Bowel/Bladder log     Bowel health education     Constipation care     Diarrhea/Fecal leakage     Colonic massage     Toilet positioning     Defecation dynamics     Sources of fiber     Return to intercourse/Dilator training     Sexual positioning     Lubricants/vaginal moisturizers     Vaginal irritants     Body mechanics     Posture/ergonomics     Diaphragmatic breathing     Resources and technology           Treatment/Session Summary:    Treatment Assessment:  Patient contiues to progess well with core and PFM strength  Communication/Consultation:  None today  Equipment provided today:  None  Recommendations/Intent for next treatment session: Next visit will focus on coordination of PFM. Total Treatment Billable Duration:  55 minutes there ex  Time In: 1300  Time Out: 175 [facility] Dr. Lissette Tamayo, PT       Charge Capture  }Post Session Pain  Med[facility] Portal  MD Guidelines  Scanned Media  Benefits  MyChart    Future Appointments   Date Time Provider Zainab Gill   7/25/2022  1:00 PM [facility]   8/25/2022 10:15 AM Jayne Lloyd MD [facility]

## 2022-07-18 ENCOUNTER — HOSPITAL ENCOUNTER (OUTPATIENT)
Dept: PHYSICAL THERAPY | Age: 42
Setting detail: RECURRING SERIES
Discharge: HOME | End: 2022-07-21
Payer: COMMERCIAL

## 2022-07-18 PROCEDURE — 97110 THERAPEUTIC EXERCISES: CPT

## 2022-07-18 ASSESSMENT — PAIN SCALES - GENERAL: PAINLEVEL_OUTOF10: 0

## 2022-07-21 NOTE — PROGRESS NOTES
Delon Speak  : 1980  Primary:   Secondary:  [address] @ [address] [address]  Phone: [phone]  Fax: [phone] Plan Frequency: one time a week for 90 days    Plan of Care/Certification Expiration Date: 22      PT Visit Info:   No data recorded    OUTPATIENT PHYSICAL THERAPY:OP NOTE TYPE: Treatment Note 2022       Episode  }Appt Desk              Treatment Diagnosis:  Lack of coordination (muscle incoordination) (R27.8)  Pelvic floor dysfunction in female (M62.98)  Generalized weakness (M62.81)  Stress incontinence (female) (male) (N39.3)  Medical/Referring Diagnosis:  Stress incontinence (female) (male) [N39.3]  Encounter for routine postpartum follow-up [Z39.2]  Referring Physician:  Sarath Corona MD MD Orders:  PT Eval and Treat   Date of Onset:  No data recorded   Allergies:   Amoxicillin-pot clavulanate  Restrictions/Precautions:  No data recordedNo data recorded   Interventions Planned (Treatment may consist of any combination of the following):    Current Treatment Recommendations: Strengthening; ADL/Self-care training; Neuromuscular re-education; Manual Therapy - Soft Tissue Mobilization; Pain management; Home exercise program; Safety education & training; Equipment evaluation, education, & procurement; Therapeutic activities     Subjective Comments:    Patient reports generalized improvement but no bad change  Initial:}    0/10Post Session:        /10  Medications Last Reviewed:  2022  Updated Objective Findings:  See below  Ms. Mat Lackey is a 36 yo female referred to pelvic floor physical therapy (PFPT) by Sarath Corona MD 2/2 Stress incontinence (female) (male) [N39.3]  Encounter for routine postpartum follow-up [Z39.2] Pt reports that symptoms began 4 months ago. She started having urinary leakage following the second pregnancy. After the first one jumping would cause leakage.  When she was pregnant the second time she had horrible nausea/vomitting. Every time she vomited she would leak. Leakage is worse after the pregnancy but better now that it was. Urinary: Frequency 5-8 x/day, 0 x/night. Positive for stress urinary incontinence (every once in a while). Negative for urge urinary incontinence, urinary urgency, urinary frequency, incomplete emptying, urinary hesitancy/intermittency, dysuria, hematuria, nocturia and nocturnal enuresis. Pt does not use pads for urinary protection; she would wear one if she went to jump or run pads per day (PPD). Fluid intake: more than 2 liters water/day; bladder irritants include: coffee 1/2 pot, occasional soda. Pt does not limit fluid intake due to bladder control. Bowel: Frequency once a take. Positive for none. Negative for pain with bowel movement, pushing/straining with bowel movement, fecal incontinence, constipation and incomplete emptying. Pt does not use pads for bowel protection; 0 pads per day (PPD). Use of stool softeners or laxatives? No     Sexual: Pt is  sexually active with male partners. Contraception/birth control: none. negative for dyspareunia. Reports dryness from post partum and uncomfortable where episiotomy scar. History of sexual abuse: No     Pelvic Organ Prolapse/Pelvic Pain: Location: none. OB History: Number of pregnancies: 2 Number of vaginal deliveries: 2 Number of c-sections: 0.   External Observations:  Voluntary contraction: [] absent     [x] present  Involuntary contraction: [] absent     [x] present  Involuntary relaxation: [] absent     [x] present  Perineal descent at rest: [] absent     [x] present  Perineal descent with bearing: [] absent     [x] present        Pelvic Floor Muscle (PFM) Assessment:  Vaginal vault size: [] decreased     [] increased     [x] WNL  Muscle volume: [] decreased     [x] WNL     [] Defect  PFM tension: [] decreased     [] increased     [x] WNL     Contraction ability:  Voluntary contraction: [] absent     [x] weak     [] moderate      [] strong  Voluntary relaxation: [] absent     [] partial     [x] complete   MMT: 2/5   PFM endurance: 3 seconds   Repetitions of endurance contractions: 4  Overflow: [x] absent     [] min     [] mod     [] severe / Compensatory mm groups include   Levator Avulsion: [] Negative  [] Positive     Tissue laxity test:  Anterior wall: [] minimum     [] moderate     [] severe      [x] WNL  Posterior wall: [] minimum     [] moderate     [] severe      [x] WNL        Palpation: via vaginal canal   Superficial Pelvic Floor Musculature (PFM): Tender? Intermediate PFM Tender? Deep PFM Tender? Superficial Transverse Perineal [] Right  [] Left  []DNT Deep Transverse Perineal [] Right  [] Left  []DNT Puborectalis [] Right  [] Left  []DNT   Ischiocavernosus [] Right  [] Left  []DNT Compressor Urethra [] Right  [] Left  []DNT Pubococcygeus [] Right  [] Left  []DNT   Bulbocavernosus [] Right  [] Left  []DNT     Ileococcygeus [] Right  [] Left  []DNT           Obturator Internus [] Right  [] Left  []DNT           Coccygeus [] Right  [] Left  []DNT             Treatment   THERAPEUTIC EXERCISE: (55 minutes):    Exercises per grid below to improve mobility, strength, and coordination. Required minimal visual, verbal, manual, and tactile cues to promote proper body alignment, promote proper body posture, promote proper body mechanics, and promote proper body breathing techniques. Progressed resistance, range, repetitions, and complexity of movement as indicated.    Date:  6-9-22 Date:  6-13-22 Date:  6-20-22 Date:  6-27-22 Date:  7-11-22 Date:  7-18-22 Date:  7-25-22   Activity/Exercise Parameters Parameters Parameters       Patient Education Discussed HEP and POC         Kegels Quick flicks and endurance holds         The kathleen Reviewed           3rd position bridge   Blue band x 20   Blue band x 20 Blue band x 20     Fire hydrants   Blue band x 20 B Blue band x 20 B Blue band x 20     2nd position bridge  x20 yellow ball      X20 with 5# weight at hips X20 with 10# weight at hips   clamshells  x20 blue band B        Seated march  x20 blue band B        Sit squat   x20 blue band     X20 5# weight    Sit to stand  x20          Quadruped leg extension  x20 B     With yellow resistance band x 20 B     Rows    Green band x 20 Green band x 20       Extensions    Green band x 20 Green band x 20       Chops    Green band x 20 all directions Green band x 20 all directions    Green band x 20 all directions   Sidestep  Green band x 20 B at door Green band x 20 all directions  Green band x 20 all directions  Green band x 20 all directions   Pelvic tilt with march   x20 B         iso abdominal   5 sec hold x 20 5 sec hold x 20  In 90/90  In 90/90   90/90 heel tap   x20 b   x20 b  Holding 5# weight Holding 10# weight   Bear plank   5 sec hold x 10   5 sec hold x 10 5 sec hold x 10       Modified side planks            Pelvic tilt with SLR    x20 B   x20 B x20 B    Quadruped crunch    x20 B   x20 B x20 B x20 B   Half kneel chop    x20 B 5#   x20 B 5# x20 B 5# X20 B 10#   Lunge static    x20 b   x20 b  x20 b   Sit squat    5# x 20   5# x 20     Seated march on ball    x20   x20 maria elena arm/leg x20 maria elena arm/leg    Plank on ball (modified)    To fatigue   To fatigue To fatigue, full plank To fatigue, full plank   Sidestep squat    x20 with 5#      Side plank with clamshell     x20 B   X20 B with 5# weight X20 B with 10# weight      All exercises performed with emphasis on kegel and breathing in order improve coordination, awareness and to minimize symptoms.     MANUAL THERAPY: ( minutes): to improve soft tissue tone    Date Type Location Comments   7/25/2022 Internal assessment/treatment                                         (Used abbreviations: MET - muscle energy technique; SCS- Strain counter strain; CTM-Connective tissue mobilizations; CR- Contract/Relax; SP- Sustained pressure; PIT- Positional inhibition techniques; STM Soft -tissue mobilization; MM- Myofascial mobilization; TrP-Trigger point release; IASTM- Instrument assisted soft tissue mobilizations, TDN-Trigger point dry needling)    Pt gives verbal consent to internal vaginal assessment/treatment without chaperon present. NEURO REEDUCATION: () to improve control and coordination of pelvic floor     Date:   Date:   Date:     Activity/Exercise Parameters Parameters Parameters   Biofeedback With sEMG was utilized for coordination of PFM. THERAPEUTIC ACTIVITY: ( minutes):     TA Educational Topic Notes Date Completed   Pathology/Anatomy/PFM Function     Bladder health education     Urinary urge suppression     The knack     Voiding strategies     Nocturia tips     Bowel/Bladder log     Bowel health education     Constipation care     Diarrhea/Fecal leakage     Colonic massage     Toilet positioning     Defecation dynamics     Sources of fiber     Return to intercourse/Dilator training     Sexual positioning     Lubricants/vaginal moisturizers     Vaginal irritants     Body mechanics     Posture/ergonomics     Diaphragmatic breathing     Resources and technology             Total Treatment Billable Duration:  55 minutes there ex  Time In: 1400  Time Out: [facility].  Catrachito Dubois PT       Charge Capture  }Post Session Pain  Med[employer] Portal  MD Guidelines  Scanned Media  Benefits  MyChart    Future Appointments   Date Time Provider Zainab Gill   8/25/2022 10:15 AM James Freitas MD [facility]

## 2022-07-25 ENCOUNTER — HOSPITAL ENCOUNTER (OUTPATIENT)
Dept: PHYSICAL THERAPY | Age: 42
Setting detail: RECURRING SERIES
Discharge: HOME | End: 2022-07-28
Payer: COMMERCIAL

## 2022-07-25 PROCEDURE — 97110 THERAPEUTIC EXERCISES: CPT

## 2022-07-25 ASSESSMENT — PAIN SCALES - GENERAL: PAINLEVEL_OUTOF10: 0

## 2022-07-25 NOTE — THERAPY EVALUATION
Nella Merida  : 1980  Primary:   Secondary:  [address] @ [employer] [facility] [address] [address]  Phone: [phone]  Fax: [phone] Plan Frequency: one time a week for 90 days    Plan of Care/Certification Expiration Date: 22      PT Visit Info:    No data recorded    OUTPATIENT PHYSICAL THERAPY:OP NOTE TYPE: Discharge Summary 2022               Episode  Appt Desk         Treatment Diagnosis:  Lack of coordination (muscle incoordination) (R27.8)  Pelvic floor dysfunction in female (M62.98)  Generalized weakness (M62.81)  Stress incontinence (female) (male) (N39.3)  * No diagnoses found *  Medical/Referring Diagnosis:  Stress incontinence (female) (male) [N39.3]  Encounter for routine postpartum follow-up [Z39.2]  Referring Physician:  Cydney Ramires MD MD Orders:  PT Eval and Treat   Return MD Appt:  Aug 2022  Date of Onset:  No data recorded   Allergies:  Amoxicillin-pot clavulanate  Restrictions/Precautions:    No data recordedNo data recorded   Medications Last Reviewed:  2022     SUBJECTIVE     Urinary: Frequency 5-8 x/day, 0 x/night. Positive for stress urinary incontinence. Negative for urge urinary incontinence, urinary urgency, urinary frequency, incomplete emptying, urinary hesitancy/intermittency, dysuria, hematuria, nocturia and nocturnal enuresis. Pt does not use pads for urinary protection; she would wear one if she went to jump or run pads per day (PPD). Fluid intake: more than 2 liters water/day; bladder irritants include: coffee 1/2 pot, occasional soda. Pt does not limit fluid intake due to bladder control. Bowel: Frequency once a take. Positive for none. Negative for pain with bowel movement, pushing/straining with bowel movement, fecal incontinence, constipation and incomplete emptying. Pt does not use pads for bowel protection; 0 pads per day (PPD). Use of stool softeners or laxatives? No     Sexual: Pt is  sexually active with male partners. Contraception/birth control: none. negative for dyspareunia. Reports dryness from post partum and uncomfortable where episiotomy scar. History of sexual abuse: No     Pelvic Organ Prolapse/Pelvic Pain: Location: none. OB History: Number of pregnancies: 2 Number of vaginal deliveries: 2 Number of c-sections: 0. External Observations:  Voluntary contraction: [] absent     [x] present  Involuntary contraction: [] absent     [x] present  Involuntary relaxation: [] absent     [x] present  Perineal descent at rest: [] absent     [x] present  Perineal descent with bearing: [] absent     [x] present        Pelvic Floor Muscle (PFM) Assessment:  Vaginal vault size: [] decreased     [] increased     [x] WNL  Muscle volume: [] decreased     [x] WNL     [] Defect  PFM tension: [] decreased     [] increased     [x] WNL     Contraction ability:  Voluntary contraction: [] absent     [x] weak     [] moderate      [] strong  Voluntary relaxation: [] absent     [] partial     [x] complete  MMT: 2/5  PFM endurance: 3 seconds  Repetitions of endurance contractions: 4  Overflow: [x] absent     [] min     [] mod     [] severe / Compensatory mm groups include  Levator Avulsion: [] Negative  [] Positive     Tissue laxity test:  Anterior wall: [] minimum     [] moderate     [] severe      [x] WNL  Posterior wall: [] minimum     [] moderate     [] severe      [x] WNL        Palpation: via vaginal canal  Superficial Pelvic Floor Musculature (PFM): Tender? Intermediate PFM Tender? Deep PFM Tender?    Superficial Transverse Perineal [] Right  [] Left  []DNT Deep Transverse Perineal [] Right  [] Left  []DNT Puborectalis [] Right  [] Left  []DNT   Ischiocavernosus [] Right  [] Left  []DNT Compressor Urethra [] Right  [] Left  []DNT Pubococcygeus [] Right  [] Left  []DNT   Bulbocavernosus [] Right  [] Left  []DNT     Ileococcygeus [] Right  [] Left  []DNT           Obturator Internus [] Right  [] Left  []DNT           Coccygeus [] Right  [] Left  []DNT                       OBJECTIVE             ASSESSMENT   DISCHARGE SUMMARY: Ms. Dalila Nunes has been seen in skilled PT from 6-9-22 to 7-25-22. Patient has attended 7 out of 7 scheduled visits, with 0 cancellation(s) and 0 no shows. Treatment has emphasized biofeedback, and coordination of PFM and core. Patient responded well to therapy, with improvements in less leakage and improved coordination. Pt has achieved goals as indicated below and all questions have been answered to their satisfaction. Pt was invited to call with any further questions or concerns as needed. Initial Assessment: Benita Osuna presents with musculoskeletal limitations including reduced coordination and awareness of PFM, poor abdominal strength and decreased pelvic floor muscle (PFM) strength. These limitations are impairing the patient's ability to properly coordinate perineal elevation and descent for normalized PFM function, contributing to urinary dysfunction. As a result, she is limited in her/his ability to participate in physical activities such as walking, swimming, or other exercise. Goals: (Goals have been discussed and agreed upon with patient.)  Short-Term Functional Goals: Time Frame: 6 weeks  Pt will demonstrate I with basic PFM HEP to improve awareness, coordination, and timing of PFM. MET  Patient will demonstrate understanding of and ability to teach back appropriate water intake, bladder irritants, toileting frequency, and positioning for improved self-management of symptoms.   MET  Patient will demonstrate ability to isolate a pelvic floor contraction without breath holding and minimal to no accessory muscle use in order to implement the knack and/or urge suppression MET  Patient will demonstrate appropriate co-contraction of the transversus abdominis and pelvic floor muscle group during exhalation in order to reduce IAP and improve functional task performance. MET  Discharge Goals: Time Frame: 12 weeks  Patient will demonstrate ability to voluntarily contract the pelvic floor muscles prior to a cough or sneeze for decreased leakage during increases in intra-abdominal pressure. Patient will be able to perform jumping without urinary leakage for improved participation in recreational activities and ADL's. MET  Patient will demonstrate independence with an advanced HEP for general conditioning, core stabilization, and mobility to facilitate carry over and independent management of symptoms. MET           Pelvic Floor Impact Questionnaire--short form 7 (PFIQ-7):  Score:  Initial:   Bladder or Urine: 0/100  Bowel or Rectum: 0/100  Vagina or Pelvis: 0/100 Most Recent: X (Date: -- )  Bladder or Urine: 0/100  Bowel or Rectum: 0/100  Vagina or Pelvis: 0/100   Interpretation of Score: Each of the 7 sections is scored on a scale from 0-3; 0 representing \"Not at all\", 3 representing \"Quite a bit\". The mean value is taken from all the answered items, then multiplied by 100 to obtain the scale score, ranging from 0-100. This process is repeated for each column representing bowel, bladder, and pelvic pain. Katerin Caban. Sherly Olmedo     Referring Physician Signature: Krista Ortega MD No Signature is Required for this note.         Post Session Pain  Charge Capture   POC Link  Treatment Note Link  MD Guidelines  [facility]

## 2022-08-25 ENCOUNTER — OFFICE VISIT (OUTPATIENT)
Dept: OBGYN CLINIC | Age: 42
End: 2022-08-25
Payer: COMMERCIAL

## 2022-08-25 VITALS
DIASTOLIC BLOOD PRESSURE: 82 MMHG | WEIGHT: 136 LBS | HEIGHT: 64 IN | SYSTOLIC BLOOD PRESSURE: 128 MMHG | BODY MASS INDEX: 23.22 KG/M2

## 2022-08-25 DIAGNOSIS — Z01.419 WELL WOMAN EXAM: Primary | ICD-10-CM

## 2022-08-25 DIAGNOSIS — Z12.4 SCREENING FOR CERVICAL CANCER: ICD-10-CM

## 2022-08-25 DIAGNOSIS — Z12.12 SCREENING FOR RECTAL CANCER: ICD-10-CM

## 2022-08-25 DIAGNOSIS — Z12.31 SCREENING MAMMOGRAM FOR BREAST CANCER: ICD-10-CM

## 2022-08-25 DIAGNOSIS — Z80.3 FAMILY HISTORY OF BREAST CANCER: ICD-10-CM

## 2022-08-25 LAB
OCCULT BLOOD, OTHER: NORMAL
VALID INTERNAL CONTROL, POC: NORMAL

## 2022-08-25 PROCEDURE — 99396 PREV VISIT EST AGE 40-64: CPT | Performed by: OBSTETRICS & GYNECOLOGY

## 2022-08-25 PROCEDURE — G0328 FECAL BLOOD SCRN IMMUNOASSAY: HCPCS | Performed by: OBSTETRICS & GYNECOLOGY

## 2022-08-25 ASSESSMENT — ENCOUNTER SYMPTOMS
ALLERGIC/IMMUNOLOGIC NEGATIVE: 1
EYES NEGATIVE: 1
ABDOMINAL PAIN: 0
GASTROINTESTINAL NEGATIVE: 1
COUGH: 0
BLOOD IN STOOL: 0
RESPIRATORY NEGATIVE: 1
SHORTNESS OF BREATH: 0

## 2022-08-25 NOTE — PROGRESS NOTES
Glaucoma in her mother; Merle Alu in her mother; Ovarian Cancer (age of onset: 43) in her mother. Richi Alba  reports that she has never smoked. She has never used smokeless tobacco. She reports that she does not drink alcohol and does not use drugs. She completed her Systems Review which is documented below. Any positive systems not related to Gyn are recommended to discuss with her PCP. Review of Systems   Constitutional: Negative. Negative for unexpected weight change. HENT: Negative. Eyes: Negative. Respiratory: Negative. Negative for cough and shortness of breath. Cardiovascular: Negative. Negative for chest pain and palpitations. Gastrointestinal: Negative. Negative for abdominal pain and blood in stool. Endocrine: Negative. Genitourinary: Negative. Negative for difficulty urinating, dysuria, menstrual problem, pelvic pain and urgency. Musculoskeletal: Negative. Skin: Negative. Allergic/Immunologic: Negative. Neurological: Negative. Hematological: Negative. Psychiatric/Behavioral: Negative. Negative for behavioral problems and confusion. All other systems reviewed and are negative. Results for orders placed or performed in visit on 08/25/22   AMB POC OCCULT, OTHER SOURCE   Result Value Ref Range    Valid Internal Control, POC y     Occult Blood, Other        Blood pressure 128/82, height 5' 4\" (1.626 m), weight 136 lb (61.7 kg), currently breastfeeding. Body mass index is 23.34 kg/m². Wt Readings from Last 3 Encounters:   08/25/22 136 lb (61.7 kg)   03/01/22 158 lb (71.7 kg)   02/22/22 162 lb (73.5 kg)      Physical Exam  Vitals reviewed. Exam conducted with a chaperone present. Constitutional:       General: She is not in acute distress. Appearance: Normal appearance. HENT:      Head: Normocephalic and atraumatic. Pulmonary:      Effort: Pulmonary effort is normal. No respiratory distress.    Chest:   Breasts:     Breasts are symmetrical. Right: Normal. No bleeding, inverted nipple, mass, nipple discharge, skin change or axillary adenopathy. Left: Normal. No bleeding, inverted nipple, mass, nipple discharge, skin change or axillary adenopathy. Abdominal:      General: Abdomen is flat. There is no distension. Palpations: There is no mass. Tenderness: There is no abdominal tenderness. There is no guarding or rebound. Hernia: No hernia is present. There is no hernia in the left inguinal area or right inguinal area. Genitourinary:     General: Normal vulva. Exam position: Lithotomy position. Labia:         Right: No rash, tenderness, lesion or injury. Left: No rash, tenderness, lesion or injury. Vagina: Normal.      Cervix: Normal.      Uterus: Normal.       Adnexa: Right adnexa normal and left adnexa normal.        Right: No tenderness. Left: No tenderness. Rectum: Normal. Guaiac result negative. Musculoskeletal:         General: Normal range of motion. Cervical back: Normal range of motion and neck supple. Lymphadenopathy:      Upper Body:      Right upper body: No axillary adenopathy. Left upper body: No axillary adenopathy. Skin:     General: Skin is warm and dry. Neurological:      General: No focal deficit present. Mental Status: She is alert and oriented to person, place, and time. Psychiatric:         Mood and Affect: Mood normal.         Behavior: Behavior normal.         Thought Content: Thought content normal.         Judgment: Judgment normal.        Assessment/plan: Adryan Ruiz was seen today for annual exam.    Diagnoses and all orders for this visit:    Well woman exam  -     PAP LB, Reflex HPV ASCUS  -     AMB POC OCCULT, OTHER SOURCE    Screening for cervical cancer  -     PAP LB, Reflex HPV ASCUS    Screening mammogram for breast cancer  -     [facility] DIGITAL SCREEN W OR WO CAD BILATERAL;  Future    Family history of breast cancer  -     [facility] DIGITAL SCREEN W OR WO CAD BILATERAL;  Future    Screening for rectal cancer  -     AMB POC OCCULT, OTHER SOURCE       Return in about 1 year (around 8/25/2023) for Annual.

## 2022-08-27 LAB
CYTOLOGIST CVX/VAG CYTO: NORMAL
CYTOLOGY CVX/VAG DOC THIN PREP: NORMAL
HPV REFLEX: NORMAL
Lab: NORMAL
PATH REPORT.FINAL DX SPEC: NORMAL
STAT OF ADQ CVX/VAG CYTO-IMP: NORMAL

## 2023-09-12 SDOH — ECONOMIC STABILITY: HOUSING INSECURITY
IN THE LAST 12 MONTHS, WAS THERE A TIME WHEN YOU DID NOT HAVE A STEADY PLACE TO SLEEP OR SLEPT IN A SHELTER (INCLUDING NOW)?: NO

## 2023-09-12 SDOH — ECONOMIC STABILITY: INCOME INSECURITY: HOW HARD IS IT FOR YOU TO PAY FOR THE VERY BASICS LIKE FOOD, HOUSING, MEDICAL CARE, AND HEATING?: NOT HARD AT ALL

## 2023-09-12 SDOH — ECONOMIC STABILITY: TRANSPORTATION INSECURITY
IN THE PAST 12 MONTHS, HAS LACK OF TRANSPORTATION KEPT YOU FROM MEETINGS, WORK, OR FROM GETTING THINGS NEEDED FOR DAILY LIVING?: NO

## 2023-09-12 SDOH — ECONOMIC STABILITY: FOOD INSECURITY: WITHIN THE PAST 12 MONTHS, THE FOOD YOU BOUGHT JUST DIDN'T LAST AND YOU DIDN'T HAVE MONEY TO GET MORE.: NEVER TRUE

## 2023-09-12 SDOH — ECONOMIC STABILITY: FOOD INSECURITY: WITHIN THE PAST 12 MONTHS, YOU WORRIED THAT YOUR FOOD WOULD RUN OUT BEFORE YOU GOT MONEY TO BUY MORE.: NEVER TRUE

## 2023-09-15 ENCOUNTER — TRANSCRIBE ORDERS (OUTPATIENT)
Dept: SCHEDULING | Age: 43
End: 2023-09-15

## 2023-09-15 ENCOUNTER — OFFICE VISIT (OUTPATIENT)
Dept: OBGYN CLINIC | Age: 43
End: 2023-09-15
Payer: COMMERCIAL

## 2023-09-15 VITALS
DIASTOLIC BLOOD PRESSURE: 70 MMHG | HEIGHT: 64 IN | SYSTOLIC BLOOD PRESSURE: 120 MMHG | BODY MASS INDEX: 23.9 KG/M2 | WEIGHT: 140 LBS

## 2023-09-15 DIAGNOSIS — N81.89 PELVIC RELAXATION: ICD-10-CM

## 2023-09-15 DIAGNOSIS — Z12.4 SCREENING FOR CERVICAL CANCER: ICD-10-CM

## 2023-09-15 DIAGNOSIS — Z01.419 WELL WOMAN EXAM WITH ROUTINE GYNECOLOGICAL EXAM: Primary | ICD-10-CM

## 2023-09-15 DIAGNOSIS — Z12.31 SCREENING MAMMOGRAM FOR HIGH-RISK PATIENT: Primary | ICD-10-CM

## 2023-09-15 PROCEDURE — 99396 PREV VISIT EST AGE 40-64: CPT | Performed by: OBSTETRICS & GYNECOLOGY

## 2023-09-15 ASSESSMENT — ENCOUNTER SYMPTOMS
BLOOD IN STOOL: 0
ALLERGIC/IMMUNOLOGIC NEGATIVE: 1
EYES NEGATIVE: 1
SHORTNESS OF BREATH: 0
COUGH: 0
RESPIRATORY NEGATIVE: 1
GASTROINTESTINAL NEGATIVE: 1
ABDOMINAL PAIN: 0

## 2023-09-15 ASSESSMENT — PATIENT HEALTH QUESTIONNAIRE - PHQ9
SUM OF ALL RESPONSES TO PHQ QUESTIONS 1-9: 0
1. LITTLE INTEREST OR PLEASURE IN DOING THINGS: 0
2. FEELING DOWN, DEPRESSED OR HOPELESS: 0
SUM OF ALL RESPONSES TO PHQ9 QUESTIONS 1 & 2: 0
SUM OF ALL RESPONSES TO PHQ QUESTIONS 1-9: 0

## 2023-09-15 NOTE — PROGRESS NOTES
Jackson Jameson is 40 y.o. female, Marlenypat Bardales, who presents today for a routine annual gynecological examination. Patient's last menstrual period was 2023 (exact date). . Feels more pelvic pressure, radha posteriorly. No ANASTASIA except with trampoline, No FI. Ow Doing well. No Gyn, Breast, G/U, or GI complaints. Her daughter is doing great! Meeting all milestones. 9/15/2023     9:00 AM 2022    10:00 AM   Mammogram/Pap Hx   Mammogram Date  2021   Mammogram Result  ASCUS NEG HPV   Pap Date 2022    Pap Result neg          9/15/2023     9:00 AM 2022    10:00 AM   GYN Intake Questionnaire   Current Form of Contraception  None   Dyspareunia  None   Post-Coital Bleeding  None   Date of Mammogram  2021   Result of Mammogram  ASCUS NEG HPV   Date of Pap 2022    Pap result neg        Contraception: none, does not want to be pregnant  Has received Gardisil: NO  Last [place] n/a  Last time cholesterol was checked: 1 years ago    OB History:   OB History    Para Term  AB Living   2 2 2     2   SAB IAB Ectopic Molar Multiple Live Births             2      # Outcome Date GA Lbr Jean-Claude/2nd Weight Sex Delivery Anes PTL Lv   2 Term 22 37w0d 07:04 / 00:55 6 lb 8.8 oz (2.97 kg) F Vag-Spont EPI N RHONDA   1 Term 17 39w5d 03:17 / 01:48 7 lb 14 oz (3.572 kg) F Vag-Spont  N RHONDA         Health Maintenance Due   Topic Date Due    Hepatitis B vaccine (1 of 3 - 3-dose series) Never done    COVID-19 Vaccine (1) Never done    Varicella vaccine (1 of 2 - 2-dose childhood series) Never done    Lipids  Never done    Depression Screen  2023    Flu vaccine (1) 2023         GYN History            Radha Hawk  has a past medical history of Abnormal Pap smear of cervix, BRCA gene mutation negative, Gestational hypertension, Iron deficiency anemia, Migraine, Pre-eclampsia, and Stress incontinence.  .    Her surgeries include  has a past surgical history that includes [place] tooth

## 2023-09-25 LAB
CYTOLOGIST CVX/VAG CYTO: ABNORMAL
CYTOLOGY CVX/VAG DOC THIN PREP: ABNORMAL
HPV APTIMA: NEGATIVE
HPV REFLEX: ABNORMAL
Lab: ABNORMAL
PATH REPORT.FINAL DX SPEC: ABNORMAL
PATHOLOGIST CVX/VAG CYTO: ABNORMAL
PATHOLOGIST PROVIDED ICD: ABNORMAL
RECOM F/U CVX/VAG CYTO: ABNORMAL
STAT OF ADQ CVX/VAG CYTO-IMP: ABNORMAL

## 2023-11-29 ENCOUNTER — HOSPITAL ENCOUNTER (OUTPATIENT)
Dept: MAMMOGRAPHY | Age: 43
Discharge: HOME | End: 2023-12-02
Attending: OBSTETRICS & GYNECOLOGY
Payer: COMMERCIAL

## 2023-11-29 DIAGNOSIS — Z12.31 SCREENING MAMMOGRAM FOR HIGH-RISK PATIENT: ICD-10-CM

## 2023-11-29 PROCEDURE — 77063 BREAST TOMOSYNTHESIS BI: CPT

## 2024-09-18 ENCOUNTER — OFFICE VISIT (OUTPATIENT)
Dept: OBGYN CLINIC | Age: 44
End: 2024-09-18
Payer: COMMERCIAL

## 2024-09-18 VITALS
SYSTOLIC BLOOD PRESSURE: 116 MMHG | HEIGHT: 64 IN | BODY MASS INDEX: 25.1 KG/M2 | DIASTOLIC BLOOD PRESSURE: 74 MMHG | WEIGHT: 147 LBS

## 2024-09-18 DIAGNOSIS — Z12.31 ENCOUNTER FOR SCREENING MAMMOGRAM FOR MALIGNANT NEOPLASM OF BREAST: ICD-10-CM

## 2024-09-18 DIAGNOSIS — N81.89 PELVIC RELAXATION: ICD-10-CM

## 2024-09-18 DIAGNOSIS — Z12.4 SCREENING FOR CERVICAL CANCER: ICD-10-CM

## 2024-09-18 DIAGNOSIS — Z11.51 SCREENING FOR HPV (HUMAN PAPILLOMAVIRUS): ICD-10-CM

## 2024-09-18 DIAGNOSIS — Z01.419 WELL WOMAN EXAM WITH ROUTINE GYNECOLOGICAL EXAM: Primary | ICD-10-CM

## 2024-09-18 PROCEDURE — 99459 PELVIC EXAMINATION: CPT | Performed by: OBSTETRICS & GYNECOLOGY

## 2024-09-18 PROCEDURE — 99396 PREV VISIT EST AGE 40-64: CPT | Performed by: OBSTETRICS & GYNECOLOGY

## 2024-09-18 ASSESSMENT — ENCOUNTER SYMPTOMS
EYES NEGATIVE: 1
BLOOD IN STOOL: 0
SHORTNESS OF BREATH: 0
COUGH: 0
RESPIRATORY NEGATIVE: 1
GASTROINTESTINAL NEGATIVE: 1
ABDOMINAL PAIN: 0
ALLERGIC/IMMUNOLOGIC NEGATIVE: 1

## 2024-09-24 LAB
COLLECTION METHOD: NORMAL
CYTOLOGIST CVX/VAG CYTO: NORMAL
CYTOLOGY CVX/VAG DOC THIN PREP: NORMAL
DATE OF LMP: NORMAL
HPV APTIMA: NEGATIVE
Lab: NORMAL
OTHER PT INFO: NORMAL
PAP SOURCE: NORMAL
PATH REPORT.FINAL DX SPEC: NORMAL
PREV CYTO INFO: NORMAL
PREV TREATMENT RESULTS: NORMAL
PREV TREATMENT: NORMAL
STAT OF ADQ CVX/VAG CYTO-IMP: NORMAL

## 2025-06-05 ENCOUNTER — TELEPHONE (OUTPATIENT)
Dept: OBGYN CLINIC | Age: 45
End: 2025-06-05

## 2025-06-05 NOTE — TELEPHONE ENCOUNTER
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 Pt also asked if the order for her mammogram in her chart still good.    Advised pt that her mammogram order is good until November of this year and the mobile mammogram bus was a safe option.